# Patient Record
Sex: FEMALE | Race: WHITE | NOT HISPANIC OR LATINO | Employment: OTHER | URBAN - METROPOLITAN AREA
[De-identification: names, ages, dates, MRNs, and addresses within clinical notes are randomized per-mention and may not be internally consistent; named-entity substitution may affect disease eponyms.]

---

## 2017-03-06 ENCOUNTER — TRANSCRIBE ORDERS (OUTPATIENT)
Dept: ADMINISTRATIVE | Facility: HOSPITAL | Age: 77
End: 2017-03-06

## 2017-03-06 DIAGNOSIS — Z87.891 HISTORY OF TOBACCO USE: Primary | ICD-10-CM

## 2017-03-13 ENCOUNTER — HOSPITAL ENCOUNTER (OUTPATIENT)
Dept: RADIOLOGY | Facility: HOSPITAL | Age: 77
Discharge: HOME | End: 2017-03-13
Payer: COMMERCIAL

## 2017-03-13 DIAGNOSIS — Z87.891 HISTORY OF TOBACCO USE: ICD-10-CM

## 2017-07-03 ENCOUNTER — LAB REQUISITION (OUTPATIENT)
Dept: LAB | Facility: HOSPITAL | Age: 77
End: 2017-07-03
Payer: MEDICARE

## 2017-07-03 DIAGNOSIS — E78.5 HYPERLIPIDEMIA: ICD-10-CM

## 2017-07-03 DIAGNOSIS — M81.0 AGE-RELATED OSTEOPOROSIS WITHOUT CURRENT PATHOLOGICAL FRACTURE: ICD-10-CM

## 2017-07-03 DIAGNOSIS — E55.9 VITAMIN D DEFICIENCY: ICD-10-CM

## 2017-07-03 LAB
25(OH)D3 SERPL-MCNC: 32.9 NG/ML (ref 30–100)
ALBUMIN SERPL BCP-MCNC: 4.1 G/DL (ref 3.5–5)
ALP SERPL-CCNC: 64 U/L (ref 46–116)
ALT SERPL W P-5'-P-CCNC: 23 U/L (ref 12–78)
ANION GAP SERPL CALCULATED.3IONS-SCNC: 9 MMOL/L (ref 4–13)
AST SERPL W P-5'-P-CCNC: 16 U/L (ref 5–45)
BASOPHILS # BLD AUTO: 0.03 THOUSANDS/ΜL (ref 0–0.1)
BASOPHILS NFR BLD AUTO: 1 % (ref 0–1)
BILIRUB SERPL-MCNC: 0.64 MG/DL (ref 0.2–1)
BUN SERPL-MCNC: 14 MG/DL (ref 5–25)
CALCIUM SERPL-MCNC: 9.6 MG/DL (ref 8.3–10.1)
CHLORIDE SERPL-SCNC: 104 MMOL/L (ref 100–108)
CO2 SERPL-SCNC: 27 MMOL/L (ref 21–32)
CREAT SERPL-MCNC: 0.66 MG/DL (ref 0.6–1.3)
EOSINOPHIL # BLD AUTO: 0.12 THOUSAND/ΜL (ref 0–0.61)
EOSINOPHIL NFR BLD AUTO: 3 % (ref 0–6)
ERYTHROCYTE [DISTWIDTH] IN BLOOD BY AUTOMATED COUNT: 13.8 % (ref 11.6–15.1)
GFR SERPL CREATININE-BSD FRML MDRD: >60 ML/MIN/1.73SQ M
GLUCOSE P FAST SERPL-MCNC: 59 MG/DL (ref 65–99)
HCT VFR BLD AUTO: 42.4 % (ref 34.8–46.1)
HGB BLD-MCNC: 14 G/DL (ref 11.5–15.4)
LYMPHOCYTES # BLD AUTO: 1.67 THOUSANDS/ΜL (ref 0.6–4.47)
LYMPHOCYTES NFR BLD AUTO: 36 % (ref 14–44)
MCH RBC QN AUTO: 31.6 PG (ref 26.8–34.3)
MCHC RBC AUTO-ENTMCNC: 33 G/DL (ref 31.4–37.4)
MCV RBC AUTO: 96 FL (ref 82–98)
MONOCYTES # BLD AUTO: 0.37 THOUSAND/ΜL (ref 0.17–1.22)
MONOCYTES NFR BLD AUTO: 8 % (ref 4–12)
NEUTROPHILS # BLD AUTO: 2.51 THOUSANDS/ΜL (ref 1.85–7.62)
NEUTS SEG NFR BLD AUTO: 52 % (ref 43–75)
NRBC BLD AUTO-RTO: 0 /100 WBCS
PLATELET # BLD AUTO: 215 THOUSANDS/UL (ref 149–390)
PMV BLD AUTO: 11.9 FL (ref 8.9–12.7)
POTASSIUM SERPL-SCNC: 3.9 MMOL/L (ref 3.5–5.3)
PROT SERPL-MCNC: 7.2 G/DL (ref 6.4–8.2)
RBC # BLD AUTO: 4.43 MILLION/UL (ref 3.81–5.12)
SODIUM SERPL-SCNC: 140 MMOL/L (ref 136–145)
WBC # BLD AUTO: 4.7 THOUSAND/UL (ref 4.31–10.16)

## 2017-07-03 PROCEDURE — 85025 COMPLETE CBC W/AUTO DIFF WBC: CPT | Performed by: INTERNAL MEDICINE

## 2017-07-03 PROCEDURE — 80053 COMPREHEN METABOLIC PANEL: CPT | Performed by: INTERNAL MEDICINE

## 2017-07-03 PROCEDURE — 82306 VITAMIN D 25 HYDROXY: CPT | Performed by: INTERNAL MEDICINE

## 2017-11-01 ENCOUNTER — LAB REQUISITION (OUTPATIENT)
Dept: LAB | Facility: HOSPITAL | Age: 77
End: 2017-11-01
Payer: MEDICARE

## 2017-11-01 DIAGNOSIS — E78.5 HYPERLIPIDEMIA: ICD-10-CM

## 2017-11-01 DIAGNOSIS — M81.0 AGE-RELATED OSTEOPOROSIS WITHOUT CURRENT PATHOLOGICAL FRACTURE: ICD-10-CM

## 2017-11-01 DIAGNOSIS — E55.9 VITAMIN D DEFICIENCY: ICD-10-CM

## 2017-11-01 LAB
25(OH)D3 SERPL-MCNC: 33.2 NG/ML (ref 30–100)
ALBUMIN SERPL BCP-MCNC: 3.9 G/DL (ref 3.5–5)
ALP SERPL-CCNC: 70 U/L (ref 46–116)
ALT SERPL W P-5'-P-CCNC: 24 U/L (ref 12–78)
ANION GAP SERPL CALCULATED.3IONS-SCNC: 7 MMOL/L (ref 4–13)
AST SERPL W P-5'-P-CCNC: 16 U/L (ref 5–45)
BILIRUB SERPL-MCNC: 0.54 MG/DL (ref 0.2–1)
BUN SERPL-MCNC: 11 MG/DL (ref 5–25)
CALCIUM SERPL-MCNC: 9.6 MG/DL (ref 8.3–10.1)
CHLORIDE SERPL-SCNC: 106 MMOL/L (ref 100–108)
CHOLEST SERPL-MCNC: 220 MG/DL (ref 50–200)
CO2 SERPL-SCNC: 27 MMOL/L (ref 21–32)
CREAT SERPL-MCNC: 0.56 MG/DL (ref 0.6–1.3)
GFR SERPL CREATININE-BSD FRML MDRD: 92 ML/MIN/1.73SQ M
GLUCOSE SERPL-MCNC: 81 MG/DL (ref 65–140)
HDLC SERPL-MCNC: 53 MG/DL (ref 40–60)
LDLC SERPL CALC-MCNC: 133 MG/DL (ref 0–100)
POTASSIUM SERPL-SCNC: 4.3 MMOL/L (ref 3.5–5.3)
PROT SERPL-MCNC: 7.1 G/DL (ref 6.4–8.2)
SODIUM SERPL-SCNC: 140 MMOL/L (ref 136–145)
TRIGL SERPL-MCNC: 168 MG/DL
TSH SERPL DL<=0.05 MIU/L-ACNC: 1.79 UIU/ML (ref 0.36–3.74)

## 2017-11-01 PROCEDURE — 84443 ASSAY THYROID STIM HORMONE: CPT | Performed by: INTERNAL MEDICINE

## 2017-11-01 PROCEDURE — 80061 LIPID PANEL: CPT | Performed by: INTERNAL MEDICINE

## 2017-11-01 PROCEDURE — 80053 COMPREHEN METABOLIC PANEL: CPT | Performed by: INTERNAL MEDICINE

## 2017-11-01 PROCEDURE — 82306 VITAMIN D 25 HYDROXY: CPT | Performed by: INTERNAL MEDICINE

## 2018-02-12 ENCOUNTER — HOSPITAL ENCOUNTER (EMERGENCY)
Facility: HOSPITAL | Age: 78
Discharge: HOME | End: 2018-02-12
Attending: EMERGENCY MEDICINE
Payer: MEDICARE

## 2018-02-12 ENCOUNTER — APPOINTMENT (EMERGENCY)
Dept: RADIOLOGY | Facility: HOSPITAL | Age: 78
End: 2018-02-12
Payer: MEDICARE

## 2018-02-12 VITALS
WEIGHT: 159 LBS | OXYGEN SATURATION: 94 % | TEMPERATURE: 98 F | HEART RATE: 60 BPM | BODY MASS INDEX: 26.49 KG/M2 | SYSTOLIC BLOOD PRESSURE: 137 MMHG | HEIGHT: 65 IN | RESPIRATION RATE: 19 BRPM | DIASTOLIC BLOOD PRESSURE: 66 MMHG

## 2018-02-12 DIAGNOSIS — R07.89 MUSCULOSKELETAL CHEST PAIN: ICD-10-CM

## 2018-02-12 DIAGNOSIS — R07.9 CHEST PAIN: Primary | ICD-10-CM

## 2018-02-12 DIAGNOSIS — M54.9 BACK PAIN: ICD-10-CM

## 2018-02-12 LAB
ANION GAP SERPL CALCULATED.3IONS-SCNC: 7 MMOL/L (ref 4–13)
ATRIAL RATE: 65 BPM
BASOPHILS # BLD AUTO: 0.03 THOUSANDS/ΜL (ref 0–0.1)
BASOPHILS NFR BLD AUTO: 1 % (ref 0–1)
BUN SERPL-MCNC: 12 MG/DL (ref 5–25)
CALCIUM SERPL-MCNC: 9.2 MG/DL (ref 8.3–10.1)
CHLORIDE SERPL-SCNC: 108 MMOL/L (ref 100–108)
CO2 SERPL-SCNC: 24 MMOL/L (ref 21–32)
CREAT SERPL-MCNC: 0.63 MG/DL (ref 0.6–1.3)
EOSINOPHIL # BLD AUTO: 0.11 THOUSAND/ΜL (ref 0–0.61)
EOSINOPHIL NFR BLD AUTO: 3 % (ref 0–6)
ERYTHROCYTE [DISTWIDTH] IN BLOOD BY AUTOMATED COUNT: 13.4 % (ref 11.6–15.1)
GFR SERPL CREATININE-BSD FRML MDRD: 88 ML/MIN/1.73SQ M
GLUCOSE SERPL-MCNC: 105 MG/DL (ref 65–140)
HCT VFR BLD AUTO: 40.4 % (ref 34.8–46.1)
HGB BLD-MCNC: 13.5 G/DL (ref 11.5–15.4)
LYMPHOCYTES # BLD AUTO: 1.19 THOUSANDS/ΜL (ref 0.6–4.47)
LYMPHOCYTES NFR BLD AUTO: 31 % (ref 14–44)
MCH RBC QN AUTO: 30.8 PG (ref 26.8–34.3)
MCHC RBC AUTO-ENTMCNC: 33.4 G/DL (ref 31.4–37.4)
MCV RBC AUTO: 92 FL (ref 82–98)
MONOCYTES # BLD AUTO: 0.33 THOUSAND/ΜL (ref 0.17–1.22)
MONOCYTES NFR BLD AUTO: 9 % (ref 4–12)
NEUTROPHILS # BLD AUTO: 2.12 THOUSANDS/ΜL (ref 1.85–7.62)
NEUTS SEG NFR BLD AUTO: 56 % (ref 43–75)
NRBC BLD AUTO-RTO: 0 /100 WBCS
P AXIS: 48 DEGREES
PLATELET # BLD AUTO: 199 THOUSANDS/UL (ref 149–390)
PMV BLD AUTO: 10.4 FL (ref 8.9–12.7)
POTASSIUM SERPL-SCNC: 3.8 MMOL/L (ref 3.5–5.3)
PR INTERVAL: 162 MS
QRS AXIS: -4 DEGREES
QRSD INTERVAL: 88 MS
QT INTERVAL: 396 MS
QTC INTERVAL: 411 MS
RBC # BLD AUTO: 4.39 MILLION/UL (ref 3.81–5.12)
SODIUM SERPL-SCNC: 139 MMOL/L (ref 136–145)
T WAVE AXIS: 87 DEGREES
TROPONIN I SERPL-MCNC: <0.02 NG/ML
VENTRICULAR RATE: 65 BPM
WBC # BLD AUTO: 3.79 THOUSAND/UL (ref 4.31–10.16)

## 2018-02-12 PROCEDURE — 84484 ASSAY OF TROPONIN QUANT: CPT | Performed by: EMERGENCY MEDICINE

## 2018-02-12 PROCEDURE — 96374 THER/PROPH/DIAG INJ IV PUSH: CPT

## 2018-02-12 PROCEDURE — 36415 COLL VENOUS BLD VENIPUNCTURE: CPT | Performed by: EMERGENCY MEDICINE

## 2018-02-12 PROCEDURE — 80048 BASIC METABOLIC PNL TOTAL CA: CPT | Performed by: EMERGENCY MEDICINE

## 2018-02-12 PROCEDURE — 99285 EMERGENCY DEPT VISIT HI MDM: CPT

## 2018-02-12 PROCEDURE — 93010 ELECTROCARDIOGRAM REPORT: CPT | Performed by: INTERNAL MEDICINE

## 2018-02-12 PROCEDURE — 93005 ELECTROCARDIOGRAM TRACING: CPT

## 2018-02-12 PROCEDURE — 85025 COMPLETE CBC W/AUTO DIFF WBC: CPT | Performed by: EMERGENCY MEDICINE

## 2018-02-12 PROCEDURE — 71046 X-RAY EXAM CHEST 2 VIEWS: CPT

## 2018-02-12 RX ORDER — ALENDRONATE SODIUM 70 MG/1
70 TABLET ORAL
COMMUNITY

## 2018-02-12 RX ORDER — KETOROLAC TROMETHAMINE 30 MG/ML
15 INJECTION, SOLUTION INTRAMUSCULAR; INTRAVENOUS ONCE
Status: COMPLETED | OUTPATIENT
Start: 2018-02-12 | End: 2018-02-12

## 2018-02-12 RX ADMIN — KETOROLAC TROMETHAMINE 15 MG: 30 INJECTION, SOLUTION INTRAMUSCULAR at 08:23

## 2018-02-12 NOTE — DISCHARGE INSTRUCTIONS
Follow up with primary care doctor in 3-5 days  If your symptoms worsen, return to the ED immediately  Chest Pain, Ambulatory Care   GENERAL INFORMATION:   Chest pain  can be caused by a range of conditions, from not serious to life-threatening  It may be caused by a heart attack or a blood clot in your lungs  Sometimes chest pain or pressure is caused by poor blood flow to your heart (angina)  Infection, inflammation, or a fracture in the bones or cartilage in your chest can cause pain or discomfort  Chest pain can also be a symptom of a digestive problem, such as acid reflux or a stomach ulcer  Common symptoms include the following:   · Fever or sweating     · Nausea or vomiting     · Shortness of breath     · Discomfort or pressure that spreads from your chest to your back, jaw, or arm     · A racing or slow heartbeat     · Feeling weak, tired, or faint  Seek immediate care for the following symptoms:   · Any of the following signs of a heart attack:      ¨ Squeezing, pressure, or pain in your chest that lasts longer than 5 minutes or returns    ¨ Discomfort or pain in your back, neck, jaw, stomach, or arm     ¨ Trouble breathing     ¨ Nausea or vomiting    ¨ Lightheadedness or a sudden cold sweat, especially with trouble breathing         · Chest discomfort that gets worse, even with medicine    · Coughing or vomiting blood    · Black or bloody bowel movements     · Vomiting that does not stop, or pain when you swallow  Treatment for chest pain  may include medicine to treat your symptoms while he determines the cause of your chest pain  You may also need any of the following:  · Antiplatelets , such as aspirin, help prevent blood clots  Take your antiplatelet medicine exactly as directed  These medicines make it more likely for you to bleed or bruise  If you are told to take aspirin, do not take acetaminophen or ibuprofen instead  · Prescription pain medicine  may be given   Ask how to take this medicine safely  Do not smoke: If you smoke, it is never too late to quit  Smoking increases your risk for a heart attack and other heart and lung conditions  Ask your healthcare provider for information about how to stop smoking if you need help  Follow up with your healthcare provider as directed: You may need more tests  You may be referred to a specialist, such as a cardiologist or gastroenterologist  Write down your questions so you remember to ask them during your visits  CARE AGREEMENT:   You have the right to help plan your care  Learn about your health condition and how it may be treated  Discuss treatment options with your caregivers to decide what care you want to receive  You always have the right to refuse treatment  The above information is an  only  It is not intended as medical advice for individual conditions or treatments  Talk to your doctor, nurse or pharmacist before following any medical regimen to see if it is safe and effective for you  © 2014 [address] is for End User's use only and may not be sold, redistributed or otherwise used for commercial purposes  All illustrations and images included in CareNotes® are the copyrighted property of [employer]  or [employer]

## 2018-02-12 NOTE — ED PROVIDER NOTES
History  Chief Complaint   Patient presents with    Back Pain     Pt c/o right sided back and chest pain, worse with arm movement since yesterday    Chest Pain      30-year-old female comes emergent department for evaluation of back pain and chest pain  States that her back pain occurred last night and believes that is due to the her sleep position however she states that the back pain kept her up all night and Tylenol made mildly better  She states that she also has had chest pain this morning, dull in nature nonradiating tender palpation  She denies any risk factors for PE  States that her chest pain is better when she lays down  Otherwise, patient denies any focal neurological deficits, shortness of breath, nausea, vomiting, abdominal pain  Medical management decision making:  I will order Toradol and I will order CBC BMP chest x-ray EKG            Prior to Admission Medications   Prescriptions Last Dose Informant Patient Reported? Taking? MULTIPLE VITAMIN PO   Yes Yes   Sig: Take 1 tablet by mouth daily   alendronate (FOSAMAX) 70 mg tablet 2/11/2018 at Unknown time  Yes Yes   Sig: Take 70 mg by mouth every 7 days sunday      Facility-Administered Medications: None       History reviewed  No pertinent past medical history  History reviewed  No pertinent surgical history  History reviewed  No pertinent family history  I have reviewed and agree with the history as documented  Social History   Substance Use Topics    Smoking status: Former Smoker     Quit date: 1/1/1994    Smokeless tobacco: Never Used    Alcohol use 4 2 oz/week     7 Glasses of wine per week        Review of Systems   Constitutional: Negative for appetite change, chills, diaphoresis, fatigue and fever  HENT: Negative for congestion, ear discharge, ear pain, hearing loss, postnasal drip, rhinorrhea, sneezing and sore throat  Eyes: Negative for pain, discharge and redness     Respiratory: Negative for choking, chest tightness, shortness of breath, wheezing and stridor  Cardiovascular: Positive for chest pain  Negative for palpitations  Gastrointestinal: Negative for abdominal distention, abdominal pain, blood in stool, constipation, diarrhea, nausea and vomiting  Genitourinary: Negative for decreased urine volume, difficulty urinating, dysuria, flank pain, frequency and hematuria  Musculoskeletal: Positive for back pain  Negative for arthralgias, gait problem, joint swelling and neck pain  Skin: Negative for color change, pallor and rash  Allergic/Immunologic: Negative for environmental allergies, food allergies and immunocompromised state  Neurological: Negative for dizziness, seizures, weakness, light-headedness, numbness and headaches  Hematological: Negative for adenopathy  Does not bruise/bleed easily  Psychiatric/Behavioral: Negative for agitation and behavioral problems  Physical Exam  ED Triage Vitals   Temperature Pulse Respirations Blood Pressure SpO2   02/12/18 0712 02/12/18 0712 02/12/18 0712 02/12/18 0712 02/12/18 0712   98 °F (36 7 °C) 72 16 156/74 98 %      Temp Source Heart Rate Source Patient Position - Orthostatic VS BP Location FiO2 (%)   02/12/18 0712 02/12/18 0712 02/12/18 0852 02/12/18 0852 --   Oral Monitor Lying Right arm       Pain Score       02/12/18 0712       7           Orthostatic Vital Signs  Vitals:    02/12/18 0712 02/12/18 0852 02/12/18 0930   BP: 156/74 128/66 137/66   Pulse: 72 63 60   Patient Position - Orthostatic VS:  Lying        Physical Exam   Constitutional: She is oriented to person, place, and time  She appears well-developed and well-nourished  HENT:   Head: Normocephalic and atraumatic  Nose: Nose normal    Mouth/Throat: Oropharynx is clear and moist    Eyes: Conjunctivae and EOM are normal  Pupils are equal, round, and reactive to light  Neck: Normal range of motion  Neck supple  Cardiovascular: Normal rate, regular rhythm and normal heart sounds  Exam reveals no gallop and no friction rub  No murmur heard  Pulmonary/Chest: Effort normal and breath sounds normal    Abdominal: Soft  Bowel sounds are normal  She exhibits no distension  There is no tenderness  There is no rebound and no guarding  Musculoskeletal: Normal range of motion  She exhibits tenderness  Reproducible right paravertebral back pain and reproducible right chest wall pain   Neurological: She is alert and oriented to person, place, and time  She has normal reflexes  Skin: Skin is warm and dry  No erythema  No pallor  Psychiatric: She has a normal mood and affect  Her behavior is normal    Nursing note and vitals reviewed  ED Medications  Medications   ketorolac (TORADOL) injection 15 mg (15 mg Intravenous Given 2/12/18 0823)       Diagnostic Studies  Results Reviewed     Procedure Component Value Units Date/Time    Troponin I [[id number]]  (Normal) Collected:  02/12/18 0747    Lab Status:  Final result Specimen:  Blood from Arm, Left Updated:  02/12/18 0819     Troponin I <0 02 ng/mL     Narrative:         [employer] Chemistry analyzer 99% cutoff is > 0 04 ng/mL in network labs    o cTnI 99% cutoff is useful only when applied to patients in the clinical setting of myocardial ischemia  o cTnI 99% cutoff should be interpreted in the context of clinical history, ECG findings and possibly cardiac imaging to establish correct diagnosis  o cTnI 99% cutoff may be suggestive but clearly not indicative of a coronary event without the clinical setting of myocardial ischemia      Basic metabolic panel [[id number]] Collected:  02/12/18 0747    Lab Status:  Final result Specimen:  Blood from Arm, Left Updated:  02/12/18 0814     Sodium 139 mmol/L      Potassium 3 8 mmol/L      Chloride 108 mmol/L      CO2 24 mmol/L      Anion Gap 7 mmol/L      BUN 12 mg/dL      Creatinine 0 63 mg/dL      Glucose 105 mg/dL      Calcium 9 2 mg/dL      eGFR 88 ml/min/1 73sq m     Narrative:         National Kidney Disease Education Program recommendations are as follows:  GFR calculation is accurate only with a steady state creatinine  Chronic Kidney disease less than 60 ml/min/1 73 sq  meters  Kidney failure less than 15 ml/min/1 73 sq  meters  CBC and differential [[id number]]  (Abnormal) Collected:  02/12/18 0711    Lab Status:  Final result Specimen:  Blood from Arm, Left Updated:  02/12/18 0759     WBC 3 79 (L) Thousand/uL      RBC 4 39 Million/uL      Hemoglobin 13 5 g/dL      Hematocrit 40 4 %      MCV 92 fL      MCH 30 8 pg      MCHC 33 4 g/dL      RDW 13 4 %      MPV 10 4 fL      Platelets 582 Thousands/uL      nRBC 0 /100 WBCs      Neutrophils Relative 56 %      Lymphocytes Relative 31 %      Monocytes Relative 9 %      Eosinophils Relative 3 %      Basophils Relative 1 %      Neutrophils Absolute 2 12 Thousands/µL      Lymphocytes Absolute 1 19 Thousands/µL      Monocytes Absolute 0 33 Thousand/µL      Eosinophils Absolute 0 11 Thousand/µL      Basophils Absolute 0 03 Thousands/µL                  XR chest 2 views   ED Interpretation by Vinicio Ross MD (02/12 0622)   No acute pulmonary disease      Final Result by Riley Leos MD (02/12 1129)      No active pulmonary disease         Workstation performed: [id number]               Procedures  Procedures      Phone Consults  ED Phone Contact    ED Course  ED Course                                MDM  CritCare Time    Disposition  Final diagnoses:   Chest pain   Back pain   Musculoskeletal chest pain     Time reflects when diagnosis was documented in both MDM as applicable and the Disposition within this note     Time User Action Codes Description Comment    2/12/2018  8:44 AM Rachel Busing Add [R07 9] Chest pain     2/12/2018  8:44 AM Rachel Busing Add [M54 9] Back pain     2/12/2018  8:44 AM Rachel Busing Add [R07 89] Musculoskeletal chest pain       ED Disposition     ED Disposition Condition Comment    Discharge  Cleta Blank discharge to home/self care     Condition at discharge: Stable        Follow-up Information     Follow up With Specialties Details Why Michael Payne MD Internal Medicine In 3 days  [address]  [phone]          Discharge Medication List as of 2/12/2018  8:45 AM      CONTINUE these medications which have NOT CHANGED    Details   alendronate (FOSAMAX) 70 mg tablet Take 70 mg by mouth every 7 days sunday, Historical Med      MULTIPLE VITAMIN PO Take 1 tablet by mouth daily, Historical Med           No discharge procedures on file  ED Provider  Attending physically available and evaluated Brenda Mews  I managed the patient along with the ED Attending      Electronically Signed by         Zachery Champagne MD  02/12/18 7911

## 2018-02-12 NOTE — ED ATTENDING ATTESTATION
Ion Stock MD, saw and evaluated the patient  I have discussed the patient with the resident/non-physician practitioner and agree with the resident's/non-physician practitioner's findings, Plan of Care, and MDM as documented in the resident's/non-physician practitioner's note, except where noted  All available labs and Radiology studies were reviewed  At this point I agree with the current assessment done in the Emergency Department  I have conducted an independent evaluation of this patient a history and physical is as follows:      Critical Care Time  CritCare Time    Procedures     79 yo female c/o back pain started last night, took tylenol with no relief  Pt this morning with dull chest pain, right sided, worse with palpation, better with laying down  No fever, no sob, no n/v/d, no abdominal pain, no diaphoresis, no radiation of pain  Vss, afebrile, lungs cta, rrr, abdomen soft nontender, reproducible chest and back tenderness  Likely msk pain  Pain meds  Cardiac workup

## 2018-08-28 ENCOUNTER — APPOINTMENT (OUTPATIENT)
Dept: LAB | Facility: CLINIC | Age: 78
End: 2018-08-28
Payer: MEDICARE

## 2018-08-28 ENCOUNTER — TRANSCRIBE ORDERS (OUTPATIENT)
Dept: LAB | Facility: CLINIC | Age: 78
End: 2018-08-28
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
## 2018-08-28 PROCEDURE — 36415 COLL VENOUS BLD VENIPUNCTURE: CPT

## 2018-08-28 PROCEDURE — 86618 LYME DISEASE ANTIBODY: CPT

## 2018-08-28 PROCEDURE — 86617 LYME DISEASE ANTIBODY: CPT

## 2018-08-29 LAB
B BURGDOR IGG SER IA-ACNC: 1.25
B BURGDOR IGM SER IA-ACNC: 0.21

## 2018-08-30 LAB

## 2019-07-02 ENCOUNTER — TRANSCRIBE ORDERS (OUTPATIENT)
Dept: LAB | Facility: CLINIC | Age: 79
End: 2019-07-02

## 2019-07-02 ENCOUNTER — APPOINTMENT (OUTPATIENT)
Dept: LAB | Facility: CLINIC | Age: 79
End: 2019-07-02
Payer: MEDICARE
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
## 2019-07-02 PROCEDURE — 86618 LYME DISEASE ANTIBODY: CPT

## 2019-07-02 PROCEDURE — 86617 LYME DISEASE ANTIBODY: CPT

## 2019-07-02 PROCEDURE — 36415 COLL VENOUS BLD VENIPUNCTURE: CPT

## 2019-07-03 LAB
B BURGDOR IGG SER IA-ACNC: 1.16
B BURGDOR IGM SER IA-ACNC: 0.25

## 2019-07-05 LAB
B BURGDOR IGG PATRN SER IB-IMP: NEGATIVE
B BURGDOR IGM PATRN SER IB-IMP: NEGATIVE
B BURGDOR18KD IGG SER QL IB: PRESENT
B BURGDOR23KD IGG SER QL IB: ABNORMAL
B BURGDOR23KD IGM SER QL IB: ABNORMAL
B BURGDOR28KD IGG SER QL IB: PRESENT
B BURGDOR30KD IGG SER QL IB: ABNORMAL
B BURGDOR39KD IGG SER QL IB: ABNORMAL
B BURGDOR39KD IGM SER QL IB: ABNORMAL
B BURGDOR41KD IGG SER QL IB: ABNORMAL
B BURGDOR41KD IGM SER QL IB: ABNORMAL
B BURGDOR45KD IGG SER QL IB: ABNORMAL
B BURGDOR58KD IGG SER QL IB: ABNORMAL
B BURGDOR66KD IGG SER QL IB: ABNORMAL
B BURGDOR93KD IGG SER QL IB: PRESENT

## 2019-08-20 ENCOUNTER — TRANSCRIBE ORDERS (OUTPATIENT)
Dept: LAB | Facility: CLINIC | Age: 79
End: 2019-08-20

## 2019-08-20 ENCOUNTER — APPOINTMENT (OUTPATIENT)
Dept: LAB | Facility: CLINIC | Age: 79
End: 2019-08-20
Payer: MEDICARE
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
## 2019-08-20 PROCEDURE — 86618 LYME DISEASE ANTIBODY: CPT | Performed by: INTERNAL MEDICINE

## 2019-08-20 PROCEDURE — 36415 COLL VENOUS BLD VENIPUNCTURE: CPT | Performed by: INTERNAL MEDICINE
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
## 2019-08-22 LAB — B BURGDOR IGG+IGM SER-ACNC: <0.91 ISR (ref 0–0.9)

## 2020-01-07 ENCOUNTER — APPOINTMENT (OUTPATIENT)
Dept: LAB | Age: 80
End: 2020-01-07
Payer: MEDICARE

## 2020-01-07 ENCOUNTER — TRANSCRIBE ORDERS (OUTPATIENT)
Dept: ADMINISTRATIVE | Age: 80
End: 2020-01-07

## 2020-01-07 DIAGNOSIS — K22.9 DISORDER OF UPPER ESOPHAGEAL SPHINCTER: ICD-10-CM

## 2020-01-07 DIAGNOSIS — E55.9 VITAMIN D DEFICIENCY: ICD-10-CM

## 2020-01-07 DIAGNOSIS — E78.5 HYPERLIPIDEMIA, UNSPECIFIED HYPERLIPIDEMIA TYPE: ICD-10-CM

## 2020-01-07 DIAGNOSIS — E55.9 VITAMIN D DEFICIENCY: Primary | ICD-10-CM

## 2020-01-07 LAB
25(OH)D3 SERPL-MCNC: 26.6 NG/ML (ref 30–100)
ALBUMIN SERPL BCP-MCNC: 4 G/DL (ref 3.5–5)
ALP SERPL-CCNC: 60 U/L (ref 46–116)
ALT SERPL W P-5'-P-CCNC: 22 U/L (ref 12–78)
ANION GAP SERPL CALCULATED.3IONS-SCNC: 5 MMOL/L (ref 4–13)
AST SERPL W P-5'-P-CCNC: 14 U/L (ref 5–45)
BILIRUB SERPL-MCNC: 0.76 MG/DL (ref 0.2–1)
BUN SERPL-MCNC: 9 MG/DL (ref 5–25)
CALCIUM SERPL-MCNC: 9.7 MG/DL (ref 8.3–10.1)
CHLORIDE SERPL-SCNC: 110 MMOL/L (ref 100–108)
CHOLEST SERPL-MCNC: 218 MG/DL (ref 50–200)
CO2 SERPL-SCNC: 28 MMOL/L (ref 21–32)
CREAT SERPL-MCNC: 0.62 MG/DL (ref 0.6–1.3)
ERYTHROCYTE [DISTWIDTH] IN BLOOD BY AUTOMATED COUNT: 13.1 % (ref 11.6–15.1)
GFR SERPL CREATININE-BSD FRML MDRD: 87 ML/MIN/1.73SQ M
GLUCOSE P FAST SERPL-MCNC: 91 MG/DL (ref 65–99)
HCT VFR BLD AUTO: 41.8 % (ref 34.8–46.1)
HDLC SERPL-MCNC: 74 MG/DL
HGB BLD-MCNC: 13.7 G/DL (ref 11.5–15.4)
LDLC SERPL CALC-MCNC: 124 MG/DL (ref 0–100)
MCH RBC QN AUTO: 32.6 PG (ref 26.8–34.3)
MCHC RBC AUTO-ENTMCNC: 32.8 G/DL (ref 31.4–37.4)
MCV RBC AUTO: 100 FL (ref 82–98)
NONHDLC SERPL-MCNC: 144 MG/DL
PLATELET # BLD AUTO: 180 THOUSANDS/UL (ref 149–390)
PMV BLD AUTO: 11.3 FL (ref 8.9–12.7)
POTASSIUM SERPL-SCNC: 4.5 MMOL/L (ref 3.5–5.3)
PROT SERPL-MCNC: 7.1 G/DL (ref 6.4–8.2)
RBC # BLD AUTO: 4.2 MILLION/UL (ref 3.81–5.12)
SODIUM SERPL-SCNC: 143 MMOL/L (ref 136–145)
TRIGL SERPL-MCNC: 101 MG/DL
WBC # BLD AUTO: 3.82 THOUSAND/UL (ref 4.31–10.16)

## 2020-01-07 PROCEDURE — 82306 VITAMIN D 25 HYDROXY: CPT

## 2020-01-07 PROCEDURE — 36415 COLL VENOUS BLD VENIPUNCTURE: CPT

## 2020-01-07 PROCEDURE — 80061 LIPID PANEL: CPT

## 2020-01-07 PROCEDURE — 85027 COMPLETE CBC AUTOMATED: CPT

## 2020-01-07 PROCEDURE — 80053 COMPREHEN METABOLIC PANEL: CPT

## 2020-07-15 ENCOUNTER — TRANSCRIBE ORDERS (OUTPATIENT)
Dept: ADMINISTRATIVE | Age: 80
End: 2020-07-15

## 2020-07-15 ENCOUNTER — APPOINTMENT (OUTPATIENT)
Dept: LAB | Age: 80
End: 2020-07-15
Payer: MEDICARE

## 2020-07-15 DIAGNOSIS — A69.20 LYME DISEASE: Primary | ICD-10-CM

## 2020-07-15 DIAGNOSIS — A69.20 LYME DISEASE: ICD-10-CM

## 2020-07-15 PROCEDURE — 36415 COLL VENOUS BLD VENIPUNCTURE: CPT

## 2020-07-15 PROCEDURE — 86618 LYME DISEASE ANTIBODY: CPT

## 2020-07-16 LAB — B BURGDOR IGG+IGM SER-ACNC: <0.91 ISR (ref 0–0.9)

## 2020-08-31 ENCOUNTER — TRANSCRIBE ORDERS (OUTPATIENT)
Dept: ADMINISTRATIVE | Age: 80
End: 2020-08-31

## 2020-08-31 ENCOUNTER — APPOINTMENT (OUTPATIENT)
Dept: LAB | Age: 80
End: 2020-08-31
Payer: MEDICARE

## 2020-08-31 DIAGNOSIS — A69.20 LYME DISEASE: ICD-10-CM

## 2020-08-31 DIAGNOSIS — A69.20 LYME DISEASE: Primary | ICD-10-CM

## 2020-08-31 PROCEDURE — 86618 LYME DISEASE ANTIBODY: CPT

## 2020-08-31 PROCEDURE — 36415 COLL VENOUS BLD VENIPUNCTURE: CPT

## 2020-09-01 LAB — B BURGDOR IGG+IGM SER-ACNC: <0.91 ISR (ref 0–0.9)

## 2022-03-10 ENCOUNTER — APPOINTMENT (OUTPATIENT)
Dept: RADIOLOGY | Age: 82
End: 2022-03-10
Payer: MEDICARE

## 2022-03-10 DIAGNOSIS — M25.562 PAIN IN BOTH KNEES, UNSPECIFIED CHRONICITY: ICD-10-CM

## 2022-03-10 DIAGNOSIS — M25.561 PAIN IN BOTH KNEES, UNSPECIFIED CHRONICITY: ICD-10-CM

## 2022-03-10 PROCEDURE — 73562 X-RAY EXAM OF KNEE 3: CPT

## 2022-03-22 ENCOUNTER — EVALUATION (OUTPATIENT)
Dept: PHYSICAL THERAPY | Age: 82
End: 2022-03-22
Payer: MEDICARE

## 2022-03-22 DIAGNOSIS — M17.0 OSTEOARTHRITIS OF BOTH KNEES, UNSPECIFIED OSTEOARTHRITIS TYPE: Primary | ICD-10-CM

## 2022-03-22 PROCEDURE — 97162 PT EVAL MOD COMPLEX 30 MIN: CPT | Performed by: PHYSICAL THERAPIST

## 2022-03-22 PROCEDURE — 97110 THERAPEUTIC EXERCISES: CPT | Performed by: PHYSICAL THERAPIST

## 2022-03-22 NOTE — PROGRESS NOTES
PT Evaluation     Today's date: 3/22/2022  Patient name: Beti Lema  : 1940  MRN: [id number]  Referring provider: Sapna Clinton MD  Dx:   Encounter Diagnosis     ICD-10-CM    1  Osteoarthritis of both knees, unspecified osteoarthritis type  M17 0                   Assessment  Assessment details: PT IE: 22  Patient reported onset of bilateral knee pain that is chronic but has increased over the past 1-2 weeks  Patient noted her right knee is more painful than left  Patient noted her right knee does swell more than left  Patient noted the following deficits due to bilateral knee pain: static standing > 5 - 10 minutes, kneeling, squatting, prolonged standing, stair ascent more challenging than descent and picking up items off the floor  Patient denies limping due to bilateral knee pain  Patient noted pain on right knee is medial and inferior while left knee pain is medial as well  Patient noted she still has to perform her daily chores due to not having anyone else to help  Patient noted the following as pain reducing: Voltaren gel  Patient had x-ray on each knee that was + for arthritic changes  Patient noted bilateral knee pain does not limit her sleep  Patient noted right le is weaker than left  Patient noted she has not fallen  Patient noted she will use a brace intermittently  Impairments: abnormal gait, abnormal or restricted ROM, abnormal movement, activity intolerance, lacks appropriate home exercise program and pain with function  Understanding of Dx/Px/POC: excellent   Prognosis: good  Prognosis details: Patient is a 80y o  year old female seen for outpatient PT evaluation with pain, mobility and functional deficits due to bilateral knee pain secondary to DJD   Patient presents to PT IE with the following problems, concerns, deficits and impairments: bilateral knee pain, decreased bilateral le range of motion, decreased bilateral le strength, right knee edema, gait and stair dysfuncitons, functional limitations and decreased tolerance to activity  Patient would benefit from skilled PT services under the following PT treatment plan to address the above noted deficits: therapeutic exercises and activities to facilitate bilateral le rom and strength, modalities, manual therapy techniques, gait and stair training, transfer training, balance and proprioception activities, IASTM techniques, Kinesio taping techniques and a hep  Thank you for the referral      Goals  Short Term goals - 4 weeks  1  Patient will be independent HEP  2   Patient will report a 25 - 50% decrease in pain complaints  3   Increase strength 1/2 grade  4   Increase ROM 5-10 degrees  Long Term goals - 8 weeks  1  Patient will report elimination of pain complaints  2   Patient will return to all recreational activities without restriction  3   ROM WFL  4   Strength 5/5   5   Patient will report static standing activities improved by > 15 minutes prior to bilateral knee symptoms or limitations  6   Patient will report she is able to resume kneeling activities in [place] without bilateral knee symptoms or limitations  7   Patient will report ability to squat during house hold activities without bilateral knee symptoms or limitations  8   Patient will report ability to improve stair ascent in which she is able to perform in a reciprocal pattern  9   Patient will report the ability to pick things up off the floor without bilateral knee symptoms or limitations        Plan  Patient would benefit from: skilled physical therapy  Planned modality interventions: cryotherapy, TENS, thermotherapy: hydrocollator packs, ultrasound and unattended electrical stimulation  Planned therapy interventions: joint mobilization, manual therapy, massage, neuromuscular re-education, aquatic therapy, balance, balance/weight bearing training, body mechanics training, compression, self care, patient education, strengthening, stretching, therapeutic exercise, therapeutic activities, therapeutic training, transfer training, flexibility, functional ROM exercises, graded activity, gait training, graded exercise, graded motor and home exercise program  Frequency: 2x week  Duration in weeks: 8  Treatment plan discussed with: patient        Subjective Evaluation    History of Present Illness  Mechanism of injury: Patient's PMHx is remarkable for hernia surgery x 2 and ulcer  RIGHT KNEE     INDICATION:   M25 561: Pain in right knee  M25 562: Pain in left knee      COMPARISON:  None     VIEWS:  XR KNEE 3 VW RIGHT NON INJURY         FINDINGS:     There is no acute fracture or dislocation      There is no joint effusion      Moderate to severe osteoarthrosis of the medial femorotibial compartment with joint space narrowing and osteophytosis      Moderate osteoarthrosis of the lateral femorotibial and patellofemoral compartments with small osteophytes      No lytic or blastic osseous lesion      There are atherosclerotic calcifications  Soft tissues are otherwise unremarkable      IMPRESSION:  Tricompartmental osteoarthrosis of the right knee most pronounced at the medial femorotibial compartment  No acute osseous abnormality  LEFT KNEE     INDICATION:   M25 561: Pain in right knee  M25 562: Pain in left knee      COMPARISON:  None     VIEWS:  XR KNEE 3 VW LEFT NON INJURY         FINDINGS:     There is no acute fracture or dislocation      There is no joint effusion      Moderate to severe osteoarthrosis of the medial femorotibial compartment with joint space narrowing and osteophytosis      Mild osteoarthrosis of the lateral femorotibial and patellofemoral compartments with small osteophytes      No lytic or blastic osseous lesion      There are atherosclerotic calcifications  Soft tissues are otherwise unremarkable      IMPRESSION:  Tricompartmental osteoarthrosis of the left knee most pronounced at the medial femorotibial compartment    No acute osseous abnormality      Pain  At best pain ratin  At worst pain ratin  Location: bilateral knee      Diagnostic Tests  X-ray: abnormal  Patient Goals  Patient goals for therapy: decreased pain, decreased edema, increased strength, independence with ADLs/IADLs and increased motion          Objective     Tenderness     Additional Tenderness Details  Patient is - TTP at bilateral knee joint  Active Range of Motion   Left Hip   Flexion: 130 degrees   Abduction: 22 degrees     Right Hip   Flexion: 128 degrees   Abduction: 18 degrees   Left Knee   Flexion: 122 degrees   Extension: -5 degrees   Extensor la degrees     Right Knee   Flexion: 106 degrees with pain  Extension: -6 degrees with pain  Extensor la degrees   Left Ankle/Foot   Dorsiflexion (ke): 8 degrees   Plantar flexion: 46 degrees     Right Ankle/Foot   Dorsiflexion (ke): 6 degrees   Plantar flexion: 44 degrees     Additional Active Range of Motion Details  Hamstring mobility on right at degrees and left at 64 degrees    Strength/Myotome Testing     Left Hip   Planes of Motion   Flexion: 4+  Extension: 4+  Abduction: 4+  Adduction: 5    Right Hip   Planes of Motion   Flexion: 4  Extension: 4+  Abduction: 4+  Adduction: 5    Left Knee   Flexion: 4+  Extension: 4    Right Knee   Flexion: 4+  Extension: 4-    Left Ankle/Foot   Dorsiflexion: 4+  Plantar flexion: 5    Right Ankle/Foot   Dorsiflexion: 4  Plantar flexion: 5    Ambulation     Ambulation: Level Surfaces   Ambulation without assistive device: independent    Additional Level Surfaces Ambulation Details  Patient ambulates with decrease in right knee extension with mid stance phase of gait      Ambulation: Stairs   Ascend stairs: independent  Pattern: reciprocal  Railings: two rails  Descend stairs: independent  Pattern: non-reciprocal  Railings: two rails    General Comments:      Knee Comments  Girth Measurements:  Knee:  Suprapatellar region: Right at 38 0 CM and left at 37 0 CM;  Mid patellar region: Right at 38 6 CM and left at 37 6 Cm;  Infrapatellar region: Right at 36 8 CM and left at 35 5Cm  Precautions: Bilateral knee pain aggravation        Manuals 3/22                                                                Neuro Re-Ed                                                                                                        Ther Ex             Nu step or recumbent bike             LAQ:B: 10 x             Seated hip flexion:B:                          Supine hamstring stretch:B:             Quad sets with slr flexion:B: 10 x             Heel slides:B: 10 x             Bridges 10 x             SAQ:B:                          Side lying hip abduction:B:             Prone hip extension:B:             Prone TKE:B:             Prone knee flexion:B:                          Standing slr x 3:B:             Standing hr and tr:B:             Mini squats to chair             Lunges of foam:B:             SLS and Tandem stance on foam:B:             Side stepping and tandem ambulation on foam                          Forward step ups:B: 6"            Lateral step ups:B: 6"            Step downs:B: 4"                                                                             Ther Activity                                       Gait Training                                       Modalities             MHP to bilateral knees while seated PRN

## 2022-03-22 NOTE — LETTER
2022    Marianna Johnson MD  [address] [address]    Patient: Tea Blanco   YOB: 1940   Date of Visit: 3/22/2022     Encounter Diagnosis     ICD-10-CM    1  Osteoarthritis of both knees, unspecified osteoarthritis type  M17 0 PT plan of care cert/re-cert       Dear Dr Geraldine Solano: Thank you for your recent referral of Tea Blanco  Please review the attached evaluation summary from Sasha's recent visit  Please verify that you agree with the plan of care by signing the attached order  If you have any questions or concerns, please do not hesitate to call  I sincerely appreciate the opportunity to share in the care of one of your patients and hope to have another opportunity to work with you in the near future  Sincerely,    Sanchez Whitley, PT      Referring Provider:      I certify that I have read the below Plan of Care and certify the need for these services furnished under this plan of treatment while under my care  Marianna Johnson MD  [address] [address]  Via Fax: [phone]          PT Evaluation     Today's date: 3/22/2022  Patient name: Tea Blanco  : 1940  MRN: [id number]  Referring provider: Scott Neff MD  Dx:   Encounter Diagnosis     ICD-10-CM    1  Osteoarthritis of both knees, unspecified osteoarthritis type  M17 0                   Assessment  Assessment details: PT IE: 22  Patient reported onset of bilateral knee pain that is chronic but has increased over the past 1-2 weeks  Patient noted her right knee is more painful than left  Patient noted her right knee does swell more than left  Patient noted the following deficits due to bilateral knee pain: static standing > 5 - 10 minutes, kneeling, squatting, prolonged standing, stair ascent more challenging than descent and picking up items off the floor    Patient denies limping due to bilateral knee pain   Patient noted pain on right knee is medial and inferior while left knee pain is medial as well  Patient noted she still has to perform her daily chores due to not having anyone else to help  Patient noted the following as pain reducing: Voltaren gel  Patient had x-ray on each knee that was + for arthritic changes  Patient noted bilateral knee pain does not limit her sleep  Patient noted right le is weaker than left  Patient noted she has not fallen  Patient noted she will use a brace intermittently  Impairments: abnormal gait, abnormal or restricted ROM, abnormal movement, activity intolerance, lacks appropriate home exercise program and pain with function  Understanding of Dx/Px/POC: excellent   Prognosis: good  Prognosis details: Patient is a 80y o  year old female seen for outpatient PT evaluation with pain, mobility and functional deficits due to bilateral knee pain secondary to DJD  Patient presents to PT IE with the following problems, concerns, deficits and impairments: bilateral knee pain, decreased bilateral le range of motion, decreased bilateral le strength, right knee edema, gait and stair dysfuncitons, functional limitations and decreased tolerance to activity  Patient would benefit from skilled PT services under the following PT treatment plan to address the above noted deficits: therapeutic exercises and activities to facilitate bilateral le rom and strength, modalities, manual therapy techniques, gait and stair training, transfer training, balance and proprioception activities, IASTM techniques, Kinesio taping techniques and a hep  Thank you for the referral      Goals  Short Term goals - 4 weeks  1  Patient will be independent HEP  2   Patient will report a 25 - 50% decrease in pain complaints  3   Increase strength 1/2 grade  4   Increase ROM 5-10 degrees  Long Term goals - 8 weeks  1  Patient will report elimination of pain complaints    2   Patient will return to all recreational activities without restriction  3   ROM WFL  4   Strength 5/5   5   Patient will report static standing activities improved by > 15 minutes prior to bilateral knee symptoms or limitations  6   Patient will report she is able to resume kneeling activities in Sikh[employer] without bilateral knee symptoms or limitations  7   Patient will report ability to squat during house hold activities without bilateral knee symptoms or limitations  8   Patient will report ability to improve stair ascent in which she is able to perform in a reciprocal pattern  9   Patient will report the ability to pick things up off the floor without bilateral knee symptoms or limitations  Plan  Patient would benefit from: skilled physical therapy  Planned modality interventions: cryotherapy, TENS, thermotherapy: hydrocollator packs, ultrasound and unattended electrical stimulation  Planned therapy interventions: joint mobilization, manual therapy, massage, neuromuscular re-education, aquatic therapy, balance, balance/weight bearing training, body mechanics training, compression, self care, patient education, strengthening, stretching, therapeutic exercise, therapeutic activities, therapeutic training, transfer training, flexibility, functional ROM exercises, graded activity, gait training, graded exercise, graded motor and home exercise program  Frequency: 2x week  Duration in weeks: 8  Treatment plan discussed with: patient        Subjective Evaluation    History of Present Illness  Mechanism of injury: Patient's PMHx is remarkable for hernia surgery x 2 and ulcer      RIGHT KNEE     INDICATION:   M25 561: Pain in right knee  M25 562: Pain in left knee      COMPARISON:  None     VIEWS:  XR KNEE 3 VW RIGHT NON INJURY         FINDINGS:     There is no acute fracture or dislocation      There is no joint effusion      Moderate to severe osteoarthrosis of the medial femorotibial compartment with joint space narrowing and osteophytosis      Moderate osteoarthrosis of the lateral femorotibial and patellofemoral compartments with small osteophytes      No lytic or blastic osseous lesion      There are atherosclerotic calcifications  Soft tissues are otherwise unremarkable      IMPRESSION:  Tricompartmental osteoarthrosis of the right knee most pronounced at the medial femorotibial compartment  No acute osseous abnormality  LEFT KNEE     INDICATION:   M25 561: Pain in right knee  M25 562: Pain in left knee      COMPARISON:  None     VIEWS:  XR KNEE 3 VW LEFT NON INJURY         FINDINGS:     There is no acute fracture or dislocation      There is no joint effusion      Moderate to severe osteoarthrosis of the medial femorotibial compartment with joint space narrowing and osteophytosis      Mild osteoarthrosis of the lateral femorotibial and patellofemoral compartments with small osteophytes      No lytic or blastic osseous lesion      There are atherosclerotic calcifications  Soft tissues are otherwise unremarkable      IMPRESSION:  Tricompartmental osteoarthrosis of the left knee most pronounced at the medial femorotibial compartment  No acute osseous abnormality      Pain  At best pain ratin  At worst pain ratin  Location: bilateral knee      Diagnostic Tests  X-ray: abnormal  Patient Goals  Patient goals for therapy: decreased pain, decreased edema, increased strength, independence with ADLs/IADLs and increased motion          Objective     Tenderness     Additional Tenderness Details  Patient is - TTP at bilateral knee joint      Active Range of Motion   Left Hip   Flexion: 130 degrees   Abduction: 22 degrees     Right Hip   Flexion: 128 degrees   Abduction: 18 degrees   Left Knee   Flexion: 122 degrees   Extension: -5 degrees   Extensor la degrees     Right Knee   Flexion: 106 degrees with pain  Extension: -6 degrees with pain  Extensor la degrees   Left Ankle/Foot   Dorsiflexion (ke): 8 degrees   Plantar flexion: 46 degrees     Right Ankle/Foot   Dorsiflexion (ke): 6 degrees   Plantar flexion: 44 degrees     Additional Active Range of Motion Details  Hamstring mobility on right at degrees and left at 64 degrees    Strength/Myotome Testing     Left Hip   Planes of Motion   Flexion: 4+  Extension: 4+  Abduction: 4+  Adduction: 5    Right Hip   Planes of Motion   Flexion: 4  Extension: 4+  Abduction: 4+  Adduction: 5    Left Knee   Flexion: 4+  Extension: 4    Right Knee   Flexion: 4+  Extension: 4-    Left Ankle/Foot   Dorsiflexion: 4+  Plantar flexion: 5    Right Ankle/Foot   Dorsiflexion: 4  Plantar flexion: 5    Ambulation     Ambulation: Level Surfaces   Ambulation without assistive device: independent    Additional Level Surfaces Ambulation Details  Patient ambulates with decrease in right knee extension with mid stance phase of gait  Ambulation: Stairs   Ascend stairs: independent  Pattern: reciprocal  Railings: two rails  Descend stairs: independent  Pattern: non-reciprocal  Railings: two rails    General Comments:      Knee Comments  Girth Measurements:  Knee:  Suprapatellar region: Right at 38 0 CM and left at 37 0 CM; Mid patellar region: Right at 38 6 CM and left at 37 6 Cm;  Infrapatellar region: Right at 36 8 CM and left at 35 5Cm  Precautions: Bilateral knee pain aggravation        Manuals 3/22                                                                Neuro Re-Ed                                                                                                        Ther Ex             Nu step or recumbent bike             LAQ:B: 10 x             Seated hip flexion:B:                          Supine hamstring stretch:B:             Quad sets with slr flexion:B: 10 x             Heel slides:B: 10 x             Bridges 10 x             SAQ:B:                          Side lying hip abduction:B:             Prone hip extension:B:             Prone TKE:B:             Prone knee flexion:B:                          Standing slr x 3:B:             Standing hr and tr:B:             Mini squats to chair             Lunges of foam:B:             SLS and Tandem stance on foam:B:             Side stepping and tandem ambulation on foam                          Forward step ups:B: 6"            Lateral step ups:B: 6"            Step downs:B: 4"                                                                             Ther Activity                                       Gait Training                                       Modalities             MHP to bilateral knees while seated PRN

## 2022-03-30 ENCOUNTER — OFFICE VISIT (OUTPATIENT)
Dept: PHYSICAL THERAPY | Age: 82
End: 2022-03-30
Payer: MEDICARE

## 2022-03-30 DIAGNOSIS — M17.0 OSTEOARTHRITIS OF BOTH KNEES, UNSPECIFIED OSTEOARTHRITIS TYPE: Primary | ICD-10-CM

## 2022-03-30 PROCEDURE — 97110 THERAPEUTIC EXERCISES: CPT | Performed by: PHYSICAL THERAPIST

## 2022-03-30 NOTE — PROGRESS NOTES
Daily Note     Today's date: 3/30/2022  Patient name: Coral Chowdhury  : 1940  MRN: [id number]  Referring provider: Lee Ann Chris MD  Dx:   Encounter Diagnosis     ICD-10-CM    1  Osteoarthritis of both knees, unspecified osteoarthritis type  M17 0                   Subjective: Patient reported bilateral knee pain is at minimal levels and she noted hep is going well  Objective: See treatment diary below  Assessment: Tolerated treatment well  Patient exhibited good technique with therapeutic exercises  Patient able to perform both open and closed kinetic chain activities with only DOMS presentation  Thus, PT is warranted to facilitate increase in bilateral le MMT and functional progress  Patient provided with new written  Hep  Plan: Continue per plan of care  Precautions: Bilateral knee pain aggravation        Manuals 3/22 3/30                                                               Neuro Re-Ed                                                                                                        Ther Ex             Nu step or recumbent bike  8 min           LAQ:B: 10 x  3 sec x 20           Seated hip flexion:B:  2 x 10                        Supine hamstring stretch:B:  NT           Quad sets with slr flexion:B: 10 x  2 x 10           Heel slides:B: 10 x  2 x 10           Bridges 10 x  2 x 10           SAQ:B:                          Side lying hip abduction:B:             Prone hip extension:B:             Prone TKE:B:             Prone knee flexion:B:                          Standing slr x 3:B:  2 x 10           Standing hr and tr:B:  2 x 10           Mini squats to chair  2 x 10           Lunges of foam:B:  NT           SLS and Tandem stance on foam:B:  30 sec x 2           Side stepping and tandem ambulation on foam  4 x in parallel bars                        Forward step ups:B: 6"            Lateral step ups:B: 6"            Step downs:B: 4" Ther Activity                                       Gait Training                                       Modalities             MHP to bilateral knees while seated PRN

## 2022-04-04 ENCOUNTER — OFFICE VISIT (OUTPATIENT)
Dept: PHYSICAL THERAPY | Age: 82
End: 2022-04-04
Payer: MEDICARE

## 2022-04-04 DIAGNOSIS — M17.0 OSTEOARTHRITIS OF BOTH KNEES, UNSPECIFIED OSTEOARTHRITIS TYPE: Primary | ICD-10-CM

## 2022-04-04 PROCEDURE — 97110 THERAPEUTIC EXERCISES: CPT

## 2022-04-04 NOTE — PROGRESS NOTES
Daily Note     Today's date: 2022  Patient name: Essie Shipman  : 1940  MRN: [id number]  Referring provider: Joselyn Rich MD  Dx:   Encounter Diagnosis     ICD-10-CM    1  Osteoarthritis of both knees, unspecified osteoarthritis type  M17 0                   Subjective: Patient noted 5/10 pain today at B knees  Objective: See treatment diary below  Assessment: Decreased pain to 2/10 after B LE exercises  Plan: Continue per plan of care  Precautions: Bilateral knee pain aggravation        Manuals 3/22 3/30 4/4                                    Neuro Re-Ed                                                    Ther Ex             Nu step or recumbent bike  8 min 10 min           LAQ:B: 10 x  3 sec x 20 20x 3#           Seated hip flexion:B:  2 x 10 20x 3#                        Supine hamstring stretch:B:  NT 20sec 5x            Quad sets with slr flexion:B: 10 x  2 x 10 20x           Heel slides:B: 10 x  2 x 10 20x           Bridges 10 x  2 x 10 20x 3sec           SAQ:B:   20x 3sec 3#                        Side lying hip abduction:B:   NT          Prone hip extension:B:   NT          Prone TKE:B:   NT          Prone knee flexion:B:   NT                       Standing slr x 3:B:  2 x 10 20X 3#           Standing hr and tr:B:  2 x 10 20X 3#           Mini squats to chair  2 x 10 20X 3#           Lunges of foam:B:  NT NT          SLS and Tandem stance on foam:B:  30 sec x 2 30SEC 5X          Side stepping and tandem ambulation on foam  4 x in parallel bars 6X                        Forward step ups:B: 6"  20X           Lateral step ups:B: 6"  NT          Step downs:B: 4"  NT                                    Ther Activity                                       Gait Training                                       Modalities             MHP to bilateral knees while seated PRN

## 2022-04-06 ENCOUNTER — OFFICE VISIT (OUTPATIENT)
Dept: PHYSICAL THERAPY | Age: 82
End: 2022-04-06
Payer: MEDICARE

## 2022-04-06 DIAGNOSIS — M17.0 OSTEOARTHRITIS OF BOTH KNEES, UNSPECIFIED OSTEOARTHRITIS TYPE: Primary | ICD-10-CM

## 2022-04-06 PROCEDURE — 97110 THERAPEUTIC EXERCISES: CPT | Performed by: PHYSICAL THERAPIST

## 2022-04-06 NOTE — PROGRESS NOTES
Daily Note     Today's date: 2022  Patient name: Rojas Cope  : 1940  MRN: [id number]  Referring provider: Murtis Cushing, MD  Dx:   Encounter Diagnosis     ICD-10-CM    1  Osteoarthritis of both knees, unspecified osteoarthritis type  M17 0                   Subjective: Patient right knee pain is > left with prolonged walking or fast walking as pain aggravating  Thus, she noted she has reduced her pace and distance for pain reduction for bilateral knees  Objective: See treatment diary below  Assessment: Patient presents with bilateral le fatigue with both open and closed kinetic chain activities that mimics standing based functional deficits  Thus, PT is warranted to facilitate bilateral le rom and strength to facilitate functional progress to return to all symptom free functional status  Plan: Continue per plan of care  Precautions: Bilateral knee pain aggravation        Manuals 3/22 3/30 4/4 4/6                                   Neuro Re-Ed                                                    Ther Ex             Nu step or recumbent bike  8 min 10 min  Nu step 10 min         LAQ:B: 10 x  3 sec x 20 20x 3#  3 sec x 20 with 3#         Seated hip flexion:B:  2 x 10 20x 3#  3 sec x 20 with 3#                      Supine hamstring stretch:B:  NT 20sec 5x  20 sec x 5         Quad sets with slr flexion:B: 10 x  2 x 10 20x  2# x 20         Heel slides:B: 10 x  2 x 10 20x  2 x 10         Bridges 10 x  2 x 10 20x 3sec  3 sec x 20         SAQ:B:   20x 3sec 3#  3# x 20                      Side lying hip abduction:B:   NT NT         Prone hip extension:B:   NT NT         Prone TKE:B:   NT NT         Prone knee flexion:B:   NT NT                      Standing slr x 3:B:  2 x 10 20X 3#  3# x 20         Standing hr and tr:B:  2 x 10 20X 3#  3# x 20         Mini squats to chair  2 x 10 20X 3#  2 x 10         Lunges of foam:B:  NT NT 2 x 10 with 3#         SLS and Tandem stance on foam:B: 30 sec x 2 30SEC 5X 30 sec x 4         Side stepping and tandem ambulation on foam  4 x in parallel bars 6X  3# x 6 on foam                      Forward step ups:B: 6"  20X  2 x 10 on 6"         Lateral step ups:B: 6"  NT 2 x 10 on 6"         Step downs:B: 4"  NT NT                                   Ther Activity                                       Gait Training                                       Modalities             MHP to bilateral knees while seated PRN

## 2022-04-11 ENCOUNTER — OFFICE VISIT (OUTPATIENT)
Dept: PHYSICAL THERAPY | Age: 82
End: 2022-04-11
Payer: MEDICARE

## 2022-04-11 DIAGNOSIS — M17.0 OSTEOARTHRITIS OF BOTH KNEES, UNSPECIFIED OSTEOARTHRITIS TYPE: Primary | ICD-10-CM

## 2022-04-11 PROCEDURE — 97110 THERAPEUTIC EXERCISES: CPT | Performed by: PHYSICAL THERAPIST

## 2022-04-11 NOTE — PROGRESS NOTES
Daily Note     Today's date: 2022  Patient name: Leyda Guerrier  : 1940  MRN: [id number]  Referring provider: David Camacho MD  Dx:   Encounter Diagnosis     ICD-10-CM    1  Osteoarthritis of both knees, unspecified osteoarthritis type  M17 0                   Subjective: Patient reported intermittent right knee pain which is produced after physical activities  Patient reported she is feeling better over all but still gets right > left knee pain aggravation after prolonged weight baring activities  Objective: See treatment diary below  Assessment: Patient presents with lumbar spine extension treatment principle with left lumbar spine pain 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  Thus, PT is warranted to facilitate bilateral le rom and strength to facilitate functional progress to return to all symptom free functional status  Plan: Continue per plan of care  Precautions: Bilateral knee pain aggravation        Manuals 3/22 3/30 4/4 4/6 4/11                                  Neuro Re-Ed                                                    Ther Ex             Nu step or recumbent bike  8 min 10 min  Nu step 10 min Nu step 10 min        Lumbar extension against parallel bar or counter top     3 x 10                     LAQ:B: 10 x  3 sec x 20 20x 3#  3 sec x 20 with 3# 3 sec x 20 with 3#        Seated hip flexion:B:  2 x 10 20x 3#  3 sec x 20 with 3# NT                     Supine hamstring stretch:B:  NT 20sec 5x  20 sec x 5 NT        Quad sets with slr flexion:B: 10 x  2 x 10 20x  2# x 20 2# x 20        Heel slides:B: 10 x  2 x 10 20x  2 x 10 2# x 20        Bridges 10 x  2 x 10 20x 3sec  3 sec x 20 3 sec x 20        SAQ:B:   20x 3sec 3#  3# x 20 2# x 20 with 3 sec hold                     Side lying hip abduction:B:   NT NT NT        Prone hip extension:B:   NT NT NT        Prone TKE:B:   NT NT NT        Prone knee flexion:B:   NT NT NT Standing slr x 3:B:  2 x 10 20X 3#  3# x 20 3# x 20        Standing hr and tr:B:  2 x 10 20X 3#  3# x 20 2 x 10        Mini squats to chair  2 x 10 20X 3#  2 x 10 2 x 10        Lunges of foam:B:  NT NT 2 x 10 with 3# 3# x 20        SLS and Tandem stance on foam:B:  30 sec x 2 30SEC 5X 30 sec x 4 30 sec x 4        Side stepping and tandem ambulation on foam  4 x in parallel bars 6X  3# x 6 on foam 3# x 4  Each direction on foam                     Forward step ups:B: 6"  20X  2 x 10 on 6" NT        Lateral step ups:B: 6"  NT 2 x 10 on 6" NT        Step downs:B: 4"  NT NT NT                                  Ther Activity                                       Gait Training                                       Modalities             MHP to bilateral knees while seated PRN

## 2022-04-14 ENCOUNTER — OFFICE VISIT (OUTPATIENT)
Dept: PHYSICAL THERAPY | Age: 82
End: 2022-04-14
Payer: MEDICARE

## 2022-04-14 DIAGNOSIS — M17.0 OSTEOARTHRITIS OF BOTH KNEES, UNSPECIFIED OSTEOARTHRITIS TYPE: Primary | ICD-10-CM

## 2022-04-14 PROCEDURE — 97110 THERAPEUTIC EXERCISES: CPT

## 2022-04-14 NOTE — PROGRESS NOTES
Daily Note     Today's date: 2022  Patient name: Corinna Castillo  : 1940  MRN: [id number]  Referring provider: Chely Villarreal MD  Dx:   Encounter Diagnosis     ICD-10-CM    1  Osteoarthritis of both knees, unspecified osteoarthritis type  M17 0                   Subjective: Patient reported "I am feeling stronger my left knee gets sore"       Objective: See treatment diary below  Assessment: Fair tolerance to exercises  Challenged with L LE exercises due to intermittent pain  Plan: Continue per plan of care  Precautions: Bilateral knee pain aggravation        Manuals 3/22 3/30 4/4 4/6 4/11 4/14                                 Neuro Re-Ed                                                    Ther Ex             Nu step or recumbent bike  8 min 10 min  Nu step 10 min Nu step 10 min Recumbent bike x 10 min       Lumbar extension against parallel bar or counter top     3 x 10 3 x 10                    LAQ:B: 10 x  3 sec x 20 20x 3#  3 sec x 20 with 3# 3 sec x 20 with 3# NT       Seated hip flexion:B:  2 x 10 20x 3#  3 sec x 20 with 3# NT NT                    Supine hamstring stretch:B:  NT 20sec 5x  20 sec x 5 NT 20SEC 5X        Quad sets with slr flexion:B: 10 x  2 x 10 20x  2# x 20 2# x 20 20X 3SEC        Heel slides:B: 10 x  2 x 10 20x  2 x 10 2# x 20 NT       Bridges 10 x  2 x 10 20x 3sec  3 sec x 20 3 sec x 20 20X 2SEC        SAQ:B:   20x 3sec 3#  3# x 20 2# x 20 with 3 sec hold 3# 3SEC 20X                     Side lying hip abduction:B:   NT NT NT NT       Prone hip extension:B:   NT NT NT NT       Prone TKE:B:   NT NT NT NT       Prone knee flexion:B:   NT NT NT NT                    Standing slr x 3:B:  2 x 10 20X 3#  3# x 20 3# x 20 20X 3#        Standing hr and tr:B:  2 x 10 20X 3#  3# x 20 2 x 10 20X        Mini squats to chair  2 x 10 20X 3#  2 x 10 2 x 10 20X        Lunges of foam:B:  NT NT 2 x 10 with 3# 3# x 20 NT       SLS and Tandem stance on foam:B:  30 sec x 2 30SEC 5X 30 sec x 4 30 sec x 4 NT       Side stepping and tandem ambulation on foam  4 x in parallel bars 6X  3# x 6 on foam 3# x 4  Each direction on foam 6X on foam   3#                     Forward step ups:B: 6"  20X  2 x 10 on 6" NT 20x 3# 8"       Lateral step ups:B: 6"  NT 2 x 10 on 6" NT 10x B   8" 3#        Step downs:B: 4"  NT NT NT NT                                 Ther Activity                                       Gait Training                                       Modalities             MHP to bilateral knees while seated PRN

## 2022-04-18 ENCOUNTER — OFFICE VISIT (OUTPATIENT)
Dept: PHYSICAL THERAPY | Age: 82
End: 2022-04-18
Payer: MEDICARE

## 2022-04-18 DIAGNOSIS — M17.0 OSTEOARTHRITIS OF BOTH KNEES, UNSPECIFIED OSTEOARTHRITIS TYPE: Primary | ICD-10-CM

## 2022-04-18 PROCEDURE — 97110 THERAPEUTIC EXERCISES: CPT

## 2022-04-18 NOTE — PROGRESS NOTES
Daily Note     Today's date: 2022  Patient name: Essie Shipman  : 1940  MRN: [id number]  Referring provider: Joselyn Rich MD  Dx:   Encounter Diagnosis     ICD-10-CM    1  Osteoarthritis of both knees, unspecified osteoarthritis type  M17 0                   Subjective: Patient reported B knee soreness  Objective: See treatment diary below  Assessment: Muscle soreness at B LE after today's session, increased R knee pain with exercises  Trial of Ktape to right knee today with good tolerance  Plan: Continue per plan of care  Precautions: Bilateral knee pain aggravation        Manuals 3/22 3/30 4/4 4/6 4/11 4/14 4/18      K tape "U" R knee       MABEL                    Neuro Re-Ed                                                    Ther Ex             Nu step or recumbent bike  8 min 10 min  Nu step 10 min Nu step 10 min Recumbent bike x 10 min 10 min       Lumbar extension against parallel bar or counter top     3 x 10 3 x 10 30x                   LAQ:B: 10 x  3 sec x 20 20x 3#  3 sec x 20 with 3# 3 sec x 20 with 3# NT 20x 3#       Seated hip flexion:B:  2 x 10 20x 3#  3 sec x 20 with 3# NT NT 20x 3#                    Supine hamstring stretch:B:  NT 20sec 5x  20 sec x 5 NT 20SEC 5X  20sec 5x       Quad sets with slr flexion:B: 10 x  2 x 10 20x  2# x 20 2# x 20 20X 3SEC  NT      Heel slides:B: 10 x  2 x 10 20x  2 x 10 2# x 20 NT NT      Bridges 10 x  2 x 10 20x 3sec  3 sec x 20 3 sec x 20 20X 2SEC  NT      SAQ:B:   20x 3sec 3#  3# x 20 2# x 20 with 3 sec hold 3# 3SEC 20X  20X 3SEC 3#                    Side lying hip abduction:B:   NT NT NT NT NT      Prone hip extension:B:   NT NT NT NT NT      Prone TKE:B:   NT NT NT NT NT      Prone knee flexion:B:   NT NT NT NT NT      CURLS B        20X 3#       Standing slr x 3:B:  2 x 10 20X 3#  3# x 20 3# x 20 20X 3#  20X 3#       Standing hr and tr:B:  2 x 10 20X 3#  3# x 20 2 x 10 20X  30X      Mini squats to chair  2 x 10 20X 3#  2 x 10 2 x 10 20X  30X       SLS B       3SEC 10X       Lunges of foam:B:  NT NT 2 x 10 with 3# 3# x 20 NT NT        Tandem stance on foam:B:  30 sec x 2 30SEC 5X 30 sec x 4 30 sec x 4 NT 10X 10SEC       Side stepping and tandem ambulation on foam  4 x in parallel bars 6X  3# x 6 on foam 3# x 4  Each direction on foam 6X on foam   3#  NT                   Forward step ups:B: 6"  20X  2 x 10 on 6" NT 20x 3# 8" 20X 3#       Lateral step ups:B: 6"  NT 2 x 10 on 6" NT 10x B   8" 3#  20X 3#       Step downs:B: 4"  NT NT NT NT NT                                 Ther Activity                                       Gait Training                                       Modalities             MHP to bilateral knees while seated PRN

## 2022-04-21 ENCOUNTER — OFFICE VISIT (OUTPATIENT)
Dept: PHYSICAL THERAPY | Age: 82
End: 2022-04-21
Payer: MEDICARE

## 2022-04-21 DIAGNOSIS — M17.0 OSTEOARTHRITIS OF BOTH KNEES, UNSPECIFIED OSTEOARTHRITIS TYPE: Primary | ICD-10-CM

## 2022-04-21 PROCEDURE — 97110 THERAPEUTIC EXERCISES: CPT | Performed by: PHYSICAL THERAPIST

## 2022-04-21 NOTE — PROGRESS NOTES
Daily Note     Today's date: 2022  Patient name: Elaine Arevalo  : 1940  MRN: [id number]  Referring provider: Graham Rain MD  Dx:   Encounter Diagnosis     ICD-10-CM    1  Osteoarthritis of both knees, unspecified osteoarthritis type  M17 0                   Subjective: Patient without c/o prior to treatment session  Objective: See treatment diary below  Assessment: Patient stated some pain in her right knee with completion of activities; declined taping this visit  Plan: Continue per plan of care  Precautions: Bilateral knee pain aggravation        Manuals 3/22 3/30 4/4 4/6 4/11 4/14 4/18 4/21     K tape "U" R knee       MABEL                    Neuro Re-Ed                                                    Ther Ex             Nu step or recumbent bike  8 min 10 min  Nu step 10 min Nu step 10 min Recumbent bike x 10 min 10 min  10 min     Lumbar extension against parallel bar or counter top     3 x 10 3 x 10 30x 30x                  LAQ:B: 10 x  3 sec x 20 20x 3#  3 sec x 20 with 3# 3 sec x 20 with 3# NT 20x 3#  20x 3#      Seated hip flexion:B:  2 x 10 20x 3#  3 sec x 20 with 3# NT NT 20x 3#  20x 3#                   Supine hamstring stretch:B:  NT 20sec 5x  20 sec x 5 NT 20SEC 5X  20sec 5x  20sec 5x      Quad sets with slr flexion:B: 10 x  2 x 10 20x  2# x 20 2# x 20 20X 3SEC  NT      Heel slides:B: 10 x  2 x 10 20x  2 x 10 2# x 20 NT NT      Bridges 10 x  2 x 10 20x 3sec  3 sec x 20 3 sec x 20 20X 2SEC  NT      SAQ:B:   20x 3sec 3#  3# x 20 2# x 20 with 3 sec hold 3# 3SEC 20X  20X 3SEC 3#  20X 3SEC 3#                   Side lying hip abduction:B:   NT NT NT NT NT 3# 20x ea     Prone hip extension:B:   NT NT NT NT NT      Prone TKE:B:   NT NT NT NT NT      Prone knee flexion:B:   NT NT NT NT NT      CURLS B        20X 3#       Standing slr x 3:B:  2 x 10 20X 3#  3# x 20 3# x 20 20X 3#  20X 3#  20X 3#      Standing hr and tr:B:  2 x 10 20X 3#  3# x 20 2 x 10 20X  30X 30x Mini squats to chair  2 x 10 20X 3#  2 x 10 2 x 10 20X  30X  30x     SLS B       3SEC 10X  10x3"     Lunges of foam:B:  NT NT 2 x 10 with 3# 3# x 20 NT NT        Tandem stance on foam:B:  30 sec x 2 30SEC 5X 30 sec x 4 30 sec x 4 NT 10X 10SEC       Side stepping and tandem ambulation on foam  4 x in parallel bars 6X  3# x 6 on foam 3# x 4  Each direction on foam 6X on foam   3#  NT 6X on foam   3#                   Forward step ups:B: 6"  20X  2 x 10 on 6" NT 20x 3# 8" 20X 3#  20X 3#      Lateral step ups:B: 6"  NT 2 x 10 on 6" NT 10x B   8" 3#  20X 3#  20X 3#      Step downs:B: 4"  NT NT NT NT NT                                 Ther Activity                                       Gait Training                                       Modalities             MHP to bilateral knees while seated PRN

## 2022-04-27 ENCOUNTER — OFFICE VISIT (OUTPATIENT)
Dept: PHYSICAL THERAPY | Age: 82
End: 2022-04-27
Payer: MEDICARE

## 2022-04-27 DIAGNOSIS — M17.0 OSTEOARTHRITIS OF BOTH KNEES, UNSPECIFIED OSTEOARTHRITIS TYPE: Primary | ICD-10-CM

## 2022-04-27 PROCEDURE — 97110 THERAPEUTIC EXERCISES: CPT

## 2022-04-27 NOTE — PROGRESS NOTES
Daily Note     Today's date: 2022  Patient name: Leyda Guerrier  : 1940  MRN: [id number]  Referring provider: David Camacho MD  Dx:   Encounter Diagnosis     ICD-10-CM    1  Osteoarthritis of both knees, unspecified osteoarthritis type  M17 0                   Subjective: Patient noted no pain today "the swelling at my right knee went down"       Objective: See treatment diary below  Assessment: Good tolerance to exercises  Progress as able       Plan: Continue per plan of care  Precautions: Bilateral knee pain aggravation        Manuals 3/22 3/30 4/4 4/6 4/11 4/14 4/18 4/21 4/27    K tape "U" R knee       MABEL   MABEL                 Neuro Re-Ed                                                    Ther Ex             Nu step or recumbent bike  8 min 10 min  Nu step 10 min Nu step 10 min Recumbent bike x 10 min 10 min  10 min 10 MIN     Lumbar extension against parallel bar or counter top     3 x 10 3 x 10 30x 30x 30X                 LAQ:B: 10 x  3 sec x 20 20x 3#  3 sec x 20 with 3# 3 sec x 20 with 3# NT 20x 3#  20x 3#  20X 3#    Seated hip flexion:B:  2 x 10 20x 3#  3 sec x 20 with 3# NT NT 20x 3#  20x 3#  20X 3#                 Supine hamstring stretch:B:  NT 20sec 5x  20 sec x 5 NT 20SEC 5X  20sec 5x  20sec 5x  20SEC 5X     Quad sets with slr flexion:B: 10 x  2 x 10 20x  2# x 20 2# x 20 20X 3SEC  NT      Heel slides:B: 10 x  2 x 10 20x  2 x 10 2# x 20 NT NT      Bridges 10 x  2 x 10 20x 3sec  3 sec x 20 3 sec x 20 20X 2SEC  NT      SAQ:B:   20x 3sec 3#  3# x 20 2# x 20 with 3 sec hold 3# 3SEC 20X  20X 3SEC 3#  20X 3SEC 3#  20X 3SEC                  Side lying hip abduction:B:   NT NT NT NT NT 3# 20x ea NT     Prone hip extension:B:   NT NT NT NT NT  NT     Prone TKE:B:   NT NT NT NT NT  NT     Prone knee flexion:B:   NT NT NT NT NT  NT     CURLS B        20X 3#   20X 3#    Standing slr x 3:B:  2 x 10 20X 3#  3# x 20 3# x 20 20X 3#  20X 3#  20X 3#  20X 3#    Standing hr and tr:B:  2 x 10 20X 3#  3# x 20 2 x 10 20X  30X 30x 20X    Mini squats to chair  2 x 10 20X 3#  2 x 10 2 x 10 20X  30X  30x 30X    SLS B       3SEC 10X  10x3" NT    Lunges of foam:B:  NT NT 2 x 10 with 3# 3# x 20 NT NT  NT      Tandem stance on foam:B:  30 sec x 2 30SEC 5X 30 sec x 4 30 sec x 4 NT 10X 10SEC   NT    Side stepping and tandem ambulation on foam  4 x in parallel bars 6X  3# x 6 on foam 3# x 4  Each direction on foam 6X on foam   3#  NT 6X on foam   3#  6X                  Forward step ups:B: 6"  20X  2 x 10 on 6" NT 20x 3# 8" 20X 3#  20X 3#  20X 3#    Lateral step ups:B: 6"  NT 2 x 10 on 6" NT 10x B   8" 3#  20X 3#  20X 3#  20X 3#    Step downs:B: 4"  NT NT NT NT NT   NT                              Ther Activity                                       Gait Training                                       Modalities             MHP to bilateral knees while seated PRN

## 2022-04-29 ENCOUNTER — OFFICE VISIT (OUTPATIENT)
Dept: PHYSICAL THERAPY | Age: 82
End: 2022-04-29
Payer: MEDICARE

## 2022-04-29 DIAGNOSIS — M17.0 OSTEOARTHRITIS OF BOTH KNEES, UNSPECIFIED OSTEOARTHRITIS TYPE: Primary | ICD-10-CM

## 2022-04-29 PROCEDURE — 97110 THERAPEUTIC EXERCISES: CPT

## 2022-04-29 NOTE — PROGRESS NOTES
Daily Note     Today's date: 2022  Patient name: Ailin Edwards  : 1940  MRN: [id number]  Referring provider: Karan Altamirano MD  Dx:   Encounter Diagnosis     ICD-10-CM    1  Osteoarthritis of both knees, unspecified osteoarthritis type  M17 0                    Subjective: Pt reported improved B knee pain today  Objective: See treatment diary below  Assessment: Good tolerance to exercises today  Plan: Continue per plan of care  Precautions: Bilateral knee pain aggravation        Manuals            K tape "U" R knee         MABEL NT                Neuro Re-Ed                                                    Ther Ex             Nu step or recumbent bike         10 MIN  10 MIN    Lumbar extension against parallel bar or counter top         30X 40X                 LAQ:B:          20X 3# 20X 3#    Seated hip flexion:B:         20X 3# 20X 3#                 Supine hamstring stretch:B:         20SEC 5X  20SEC 5X    Quad sets with slr flexion:B:           20X 3SEC    Heel slides:B:          NT   Bridges             SAQ:B:         20X 3SEC  20X 3SEC                 Side lying hip abduction:B:          NT  20X 3#    Prone hip extension:B:         NT  20X 3#    Prone TKE:B:         NT  20X 3SEC    Prone knee flexion:B:         NT  20X 3#    CURLS B          20X 3# 20X 3#    Standing slr x 3:B:         20X 3# 20X 3#    Standing hr and tr:B:         20X 20X    Mini squats to chair         30X 20X    SLS B         NT NT   Lunges of foam:B:          NT NT     Tandem stance on foam:B:         NT NT   Side stepping and tandem ambulation on foam         6X  6X                 Forward step ups:B:          20X 3# 20X 3#    Lateral step ups:B:         20X 3# 20X 3#    Step downs:B:         NT                              Ther Activity                                       Gait Training                                       Modalities             MHP to bilateral knees while seated PRN

## 2022-05-04 ENCOUNTER — OFFICE VISIT (OUTPATIENT)
Dept: PHYSICAL THERAPY | Age: 82
End: 2022-05-04
Payer: MEDICARE

## 2022-05-04 DIAGNOSIS — M17.0 OSTEOARTHRITIS OF BOTH KNEES, UNSPECIFIED OSTEOARTHRITIS TYPE: Primary | ICD-10-CM

## 2022-05-04 PROCEDURE — 97110 THERAPEUTIC EXERCISES: CPT | Performed by: PHYSICAL THERAPIST

## 2022-05-04 PROCEDURE — 97750 PHYSICAL PERFORMANCE TEST: CPT | Performed by: PHYSICAL THERAPIST

## 2022-05-04 NOTE — LETTER
May 17, 2022    Rubi Greenberg MD  [address] [address]    Patient: Tanya Leblanc   YOB: 1940   Date of Visit: 2022     Encounter Diagnosis     ICD-10-CM    1  Osteoarthritis of both knees, unspecified osteoarthritis type  M17 0        Dear Dr Jaky Willingham: Thank you for your recent referral of Tanya Leblanc  Please review the attached evaluation summary from Sasha's recent visit  Please verify that you agree with the plan of care by signing the attached order  If you have any questions or concerns, please do not hesitate to call  I sincerely appreciate the opportunity to share in the care of one of your patients and hope to have another opportunity to work with you in the near future  Sincerely,    Torrey Whitley, PT      Referring Provider:      I certify that I have read the below Plan of Care and certify the need for these services furnished under this plan of treatment while under my care  Rubi Greenberg MD  [address] [address]  Via Fax: [phone]          PT Evaluation  / PT Reassessment    Today's date: 2022  Patient name: Tanya Leblanc  : 1940  MRN: [id number]  Referring provider: Carina Lindsey MD  Dx:   Encounter Diagnosis     ICD-10-CM    1  Osteoarthritis of both knees, unspecified osteoarthritis type  M17 0                   Assessment  Assessment details: PT Reassessment: 22  Patient reported the following progress since onset of PT:  Decrease in bilateral knee pain, decrease in lumbar spine pain, bilateral le strength, walking improved and standing activities like cooking and cleaning  But, she noted the following deficits that still persist: stair climbing, bilateral knee pain, bilateral le weakness, prolonged walking activities  PT IE: 22  Patient reported onset of bilateral knee pain that is chronic but has increased over the past 1-2 weeks  Patient noted her right knee is more painful than left  Patient noted her right knee does swell more than left  Patient noted the following deficits due to bilateral knee pain: static standing > 5 - 10 minutes, kneeling, squatting, prolonged standing, stair ascent more challenging than descent and picking up items off the floor  Patient denies limping due to bilateral knee pain  Patient noted pain on right knee is medial and inferior while left knee pain is medial as well  Patient noted she still has to perform her daily chores due to not having anyone else to help  Patient noted the following as pain reducing: Voltaren gel  Patient had x-ray on each knee that was + for arthritic changes  Patient noted bilateral knee pain does not limit her sleep  Patient noted right le is weaker than left  Patient noted she has not fallen  Patient noted she will use a brace intermittently  Impairments: abnormal gait, abnormal or restricted ROM, abnormal movement, activity intolerance, lacks appropriate home exercise program and pain with function  Understanding of Dx/Px/POC: excellent   Prognosis: good  Prognosis details: Patient is a 80y o  year old female seen for outpatient PT reevaluation with pain, mobility and functional deficits due to bilateral knee pain secondary to DJD  Patient presents to PT on reassessment with the following progress since onset of PT: decrease in bilateral knee pain, increase in bilateral le strength, gait, stair climbing, static standing activities and functional progress  But, patient presents to PT reassessment with the following problems, concerns, deficits and impairments: bilateral knee pain, decreased bilateral le range of motion, decreased bilateral le strength, right knee edema, gait and stair dysfuncitons, functional limitations and decreased tolerance to activity    Patient would benefit from skilled PT services under the following PT treatment plan to address the above noted deficits: therapeutic exercises and activities to facilitate bilateral le rom and strength, modalities, manual therapy techniques, gait and stair training, transfer training, balance and proprioception activities, IASTM techniques, Kinesio taping techniques and a hep  Thank you for the referral      Goals  Short Term goals - 4 weeks  1  Patient will be independent HEP  MET  2   Patient will report a 25 - 50% decrease in pain complaints  MET  3   Increase strength 1/2 grade  MET  4   Increase ROM 5-10 degrees  MET  Long Term goals - 8 weeks  1  Patient will report elimination of pain complaints  Partially MET  2   Patient will return to all recreational activities without restriction  Partially MET  3   ROM WFL  Partially MET  4   Strength 5/5  Partially MET  5   Patient will report static standing activities improved by > 15 minutes prior to bilateral knee symptoms or limitations  Partially MET  6   Patient will report she is able to resume kneeling activities in Denominational without bilateral knee symptoms or limitations  Partially MET  7   Patient will report ability to squat during house hold activities without bilateral knee symptoms or limitations  Partially MET  8   Patient will report ability to improve stair ascent in which she is able to perform in a reciprocal pattern  Partially MET  9   Patient will report the ability to pick things up off the floor without bilateral knee symptoms or limitations  Partially MET        Plan  Patient would benefit from: skilled physical therapy  Planned modality interventions: cryotherapy, TENS, thermotherapy: hydrocollator packs, ultrasound and unattended electrical stimulation  Planned therapy interventions: joint mobilization, manual therapy, massage, neuromuscular re-education, aquatic therapy, balance, balance/weight bearing training, body mechanics training, compression, self care, patient education, strengthening, stretching, therapeutic exercise, therapeutic activities, therapeutic training, transfer training, flexibility, functional ROM exercises, graded activity, gait training, graded exercise, graded motor and home exercise program  Frequency: 2x week  Duration in weeks: 8  Treatment plan discussed with: patient        Subjective Evaluation    History of Present Illness  Mechanism of injury: Patient's PMHx is remarkable for hernia surgery x 2 and ulcer  RIGHT KNEE     INDICATION:   M25 561: Pain in right knee  M25 562: Pain in left knee      COMPARISON:  None     VIEWS:  XR KNEE 3 VW RIGHT NON INJURY         FINDINGS:     There is no acute fracture or dislocation      There is no joint effusion      Moderate to severe osteoarthrosis of the medial femorotibial compartment with joint space narrowing and osteophytosis      Moderate osteoarthrosis of the lateral femorotibial and patellofemoral compartments with small osteophytes      No lytic or blastic osseous lesion      There are atherosclerotic calcifications  Soft tissues are otherwise unremarkable      IMPRESSION:  Tricompartmental osteoarthrosis of the right knee most pronounced at the medial femorotibial compartment  No acute osseous abnormality  LEFT KNEE     INDICATION:   M25 561: Pain in right knee  M25 562: Pain in left knee      COMPARISON:  None     VIEWS:  XR KNEE 3 VW LEFT NON INJURY         FINDINGS:     There is no acute fracture or dislocation      There is no joint effusion      Moderate to severe osteoarthrosis of the medial femorotibial compartment with joint space narrowing and osteophytosis      Mild osteoarthrosis of the lateral femorotibial and patellofemoral compartments with small osteophytes      No lytic or blastic osseous lesion      There are atherosclerotic calcifications  Soft tissues are otherwise unremarkable      IMPRESSION:  Tricompartmental osteoarthrosis of the left knee most pronounced at the medial femorotibial compartment    No acute osseous abnormality      Pain  At best pain ratin  At worst pain ratin  Location: bilateral knee      Diagnostic Tests  X-ray: abnormal  Patient Goals  Patient goals for therapy: decreased pain, decreased edema, increased strength, independence with ADLs/IADLs and increased motion          Objective     Tenderness     Additional Tenderness Details  Patient is - TTP at bilateral knee joint  Active Range of Motion   Left Hip   Flexion: 130 degrees   Abduction: 22 degrees     Right Hip   Flexion: 128 degrees   Abduction: 18 degrees   Left Knee   Flexion: 122 degrees   Extension: -5 degrees   Extensor la degrees     Right Knee   Flexion: 106 degrees with pain  Extension: -6 degrees with pain  Extensor la degrees   Left Ankle/Foot   Dorsiflexion (ke): 8 degrees   Plantar flexion: 46 degrees     Right Ankle/Foot   Dorsiflexion (ke): 6 degrees   Plantar flexion: 44 degrees     Additional Active Range of Motion Details  Hamstring mobility on right at degrees and left at 64 degrees    Strength/Myotome Testing     Left Hip   Planes of Motion   Flexion: 4+  Extension: 4+  Abduction: 4+  Adduction: 5    Right Hip   Planes of Motion   Flexion: 4+  Extension: 4+  Abduction: 4+  Adduction: 5    Left Knee   Flexion: 4+  Extension: 4+    Right Knee   Flexion: 4+  Extension: 4+    Left Ankle/Foot   Dorsiflexion: 4+  Plantar flexion: 5    Right Ankle/Foot   Dorsiflexion: 4+  Plantar flexion: 5    Ambulation     Ambulation: Level Surfaces   Ambulation without assistive device: independent    Additional Level Surfaces Ambulation Details  Patient ambulates with decrease in right knee extension with mid stance phase of gait  Ambulation: Stairs   Ascend stairs: independent  Pattern: reciprocal  Railings: two rails  Descend stairs: independent  Pattern: non-reciprocal  Railings: two rails    General Comments:      Knee Comments  Girth Measurements:  Knee:  Suprapatellar region: Right at 38 0 CM and left at 37 0 CM;   Mid patellar region: Right at 38 6 CM and left at 37 6 Cm;  Infrapatellar region: Right at 36 8 CM and left at 35 5Cm  Precautions: Bilateral knee pain aggravation        Manuals 5/4 4/27 4/29   K tape "U" R knee NT        MABEL NT                Neuro Re-Ed                                                    Ther Ex             Nu step or recumbent bike 10 min        10 MIN  10 MIN    Lumbar extension against parallel bar or counter top         30X 40X                 LAQ:B:  3# x 20        20X 3# 20X 3#    Seated hip flexion:B: 3# x 20        20X 3# 20X 3#                 Supine hamstring stretch:B: NT        20SEC 5X  20SEC 5X    Quad sets with slr flexion:B:  NT         20X 3SEC    Heel slides:B: NT         NT   Bridges NT            SAQ:B: NT        20X 3SEC  20X 3SEC                 Side lying hip abduction:B: NT         NT  20X 3#    Prone hip extension:B: NT        NT  20X 3#    Prone TKE:B: NT        NT  20X 3SEC    Prone knee flexion:B: NT        NT  20X 3#    CURLS B  NT        20X 3# 20X 3#    Standing slr x 3:B: 3# x 20        20X 3# 20X 3#    Standing hr and tr:B: 3 x 10        20X 20X    Mini squats to chair 3 x 10        30X 20X    SLS B 30 sec x 2 on foam        NT NT   Lunges of foam:B: 3 x 10         NT NT     Tandem stance on foam:B: 30 sec x 2 on foam        NT NT   Side stepping and tandem ambulation on foam 6 x with 3# on bilateral le        6X  6X                 Forward step ups:B:  8" x 20 with 3#        20X 3# 20X 3#    Lateral step ups:B: 8" x 20 with 3#        20X 3# 20X 3#    Step downs:B: NT        NT NT                             Ther Activity                                       Gait Training                                       Modalities             MHP to bilateral knees while seated PRN

## 2022-05-04 NOTE — PROGRESS NOTES
PT Evaluation  / PT Reassessment    Today's date: 2022  Patient name: Bashir Edmond  : 1940  MRN: [id number]  Referring provider: David Mackey MD  Dx:   Encounter Diagnosis     ICD-10-CM    1  Osteoarthritis of both knees, unspecified osteoarthritis type  M17 0                   Assessment  Assessment details: PT Reassessment: 22  Patient reported the following progress since onset of PT:  Decrease in bilateral knee pain, decrease in lumbar spine pain, bilateral le strength, walking improved and standing activities like cooking and cleaning  But, she noted the following deficits that still persist: stair climbing, bilateral knee pain, bilateral le weakness, prolonged walking activities  PT IE: 22  Patient reported onset of bilateral knee pain that is chronic but has increased over the past 1-2 weeks  Patient noted her right knee is more painful than left  Patient noted her right knee does swell more than left  Patient noted the following deficits due to bilateral knee pain: static standing > 5 - 10 minutes, kneeling, squatting, prolonged standing, stair ascent more challenging than descent and picking up items off the floor  Patient denies limping due to bilateral knee pain  Patient noted pain on right knee is medial and inferior while left knee pain is medial as well  Patient noted she still has to perform her daily chores due to not having anyone else to help  Patient noted the following as pain reducing: Voltaren gel  Patient had x-ray on each knee that was + for arthritic changes  Patient noted bilateral knee pain does not limit her sleep  Patient noted right le is weaker than left  Patient noted she has not fallen  Patient noted she will use a brace intermittently    Impairments: abnormal gait, abnormal or restricted ROM, abnormal movement, activity intolerance, lacks appropriate home exercise program and pain with function  Understanding of Dx/Px/POC: excellent Prognosis: good  Prognosis details: Patient is a 80y o  year old female seen for outpatient PT reevaluation with pain, mobility and functional deficits due to bilateral knee pain secondary to DJD  Patient presents to PT on reassessment with the following progress since onset of PT: decrease in bilateral knee pain, increase in bilateral le strength, gait, stair climbing, static standing activities and functional progress  But, patient presents to PT reassessment with the following problems, concerns, deficits and impairments: bilateral knee pain, decreased bilateral le range of motion, decreased bilateral le strength, right knee edema, gait and stair dysfuncitons, functional limitations and decreased tolerance to activity  Patient would benefit from skilled PT services under the following PT treatment plan to address the above noted deficits: therapeutic exercises and activities to facilitate bilateral le rom and strength, modalities, manual therapy techniques, gait and stair training, transfer training, balance and proprioception activities, IASTM techniques, Kinesio taping techniques and a hep  Thank you for the referral      Goals  Short Term goals - 4 weeks  1  Patient will be independent HEP  MET  2   Patient will report a 25 - 50% decrease in pain complaints  MET  3   Increase strength 1/2 grade  MET  4   Increase ROM 5-10 degrees  MET  Long Term goals - 8 weeks  1  Patient will report elimination of pain complaints  Partially MET  2   Patient will return to all recreational activities without restriction  Partially MET  3   ROM WFL  Partially MET  4   Strength 5/5  Partially MET  5   Patient will report static standing activities improved by > 15 minutes prior to bilateral knee symptoms or limitations  Partially MET  6   Patient will report she is able to resume kneeling activities in [place] without bilateral knee symptoms or limitations  Partially MET    7   Patient will report ability to squat during house hold activities without bilateral knee symptoms or limitations  Partially MET  8   Patient will report ability to improve stair ascent in which she is able to perform in a reciprocal pattern  Partially MET  9   Patient will report the ability to pick things up off the floor without bilateral knee symptoms or limitations  Partially MET  Plan  Patient would benefit from: skilled physical therapy  Planned modality interventions: cryotherapy, TENS, thermotherapy: hydrocollator packs, ultrasound and unattended electrical stimulation  Planned therapy interventions: joint mobilization, manual therapy, massage, neuromuscular re-education, aquatic therapy, balance, balance/weight bearing training, body mechanics training, compression, self care, patient education, strengthening, stretching, therapeutic exercise, therapeutic activities, therapeutic training, transfer training, flexibility, functional ROM exercises, graded activity, gait training, graded exercise, graded motor and home exercise program  Frequency: 2x week  Duration in weeks: 8  Treatment plan discussed with: patient        Subjective Evaluation    History of Present Illness  Mechanism of injury: Patient's PMHx is remarkable for hernia surgery x 2 and ulcer  RIGHT KNEE     INDICATION:   M25 561: Pain in right knee  M25 562: Pain in left knee      COMPARISON:  None     VIEWS:  XR KNEE 3 VW RIGHT NON INJURY         FINDINGS:     There is no acute fracture or dislocation      There is no joint effusion      Moderate to severe osteoarthrosis of the medial femorotibial compartment with joint space narrowing and osteophytosis      Moderate osteoarthrosis of the lateral femorotibial and patellofemoral compartments with small osteophytes      No lytic or blastic osseous lesion      There are atherosclerotic calcifications   Soft tissues are otherwise unremarkable      IMPRESSION:  Tricompartmental osteoarthrosis of the right knee most pronounced at the medial femorotibial compartment  No acute osseous abnormality  LEFT KNEE     INDICATION:   M25 561: Pain in right knee  M25 562: Pain in left knee      COMPARISON:  None     VIEWS:  XR KNEE 3 VW LEFT NON INJURY         FINDINGS:     There is no acute fracture or dislocation      There is no joint effusion      Moderate to severe osteoarthrosis of the medial femorotibial compartment with joint space narrowing and osteophytosis      Mild osteoarthrosis of the lateral femorotibial and patellofemoral compartments with small osteophytes      No lytic or blastic osseous lesion      There are atherosclerotic calcifications  Soft tissues are otherwise unremarkable      IMPRESSION:  Tricompartmental osteoarthrosis of the left knee most pronounced at the medial femorotibial compartment  No acute osseous abnormality      Pain  At best pain ratin  At worst pain ratin  Location: bilateral knee      Diagnostic Tests  X-ray: abnormal  Patient Goals  Patient goals for therapy: decreased pain, decreased edema, increased strength, independence with ADLs/IADLs and increased motion          Objective     Tenderness     Additional Tenderness Details  Patient is - TTP at bilateral knee joint      Active Range of Motion   Left Hip   Flexion: 130 degrees   Abduction: 22 degrees     Right Hip   Flexion: 128 degrees   Abduction: 18 degrees   Left Knee   Flexion: 122 degrees   Extension: -5 degrees   Extensor la degrees     Right Knee   Flexion: 106 degrees with pain  Extension: -6 degrees with pain  Extensor la degrees   Left Ankle/Foot   Dorsiflexion (ke): 8 degrees   Plantar flexion: 46 degrees     Right Ankle/Foot   Dorsiflexion (ke): 6 degrees   Plantar flexion: 44 degrees     Additional Active Range of Motion Details  Hamstring mobility on right at degrees and left at 64 degrees    Strength/Myotome Testing     Left Hip   Planes of Motion   Flexion: 4+  Extension: 4+  Abduction: 4+  Adduction: 5    Right Hip Planes of Motion   Flexion: 4+  Extension: 4+  Abduction: 4+  Adduction: 5    Left Knee   Flexion: 4+  Extension: 4+    Right Knee   Flexion: 4+  Extension: 4+    Left Ankle/Foot   Dorsiflexion: 4+  Plantar flexion: 5    Right Ankle/Foot   Dorsiflexion: 4+  Plantar flexion: 5    Ambulation     Ambulation: Level Surfaces   Ambulation without assistive device: independent    Additional Level Surfaces Ambulation Details  Patient ambulates with decrease in right knee extension with mid stance phase of gait  Ambulation: Stairs   Ascend stairs: independent  Pattern: reciprocal  Railings: two rails  Descend stairs: independent  Pattern: non-reciprocal  Railings: two rails    General Comments:      Knee Comments  Girth Measurements:  Knee:  Suprapatellar region: Right at 38 0 CM and left at 37 0 CM; Mid patellar region: Right at 38 6 CM and left at 37 6 Cm;  Infrapatellar region: Right at 36 8 CM and left at 35 5Cm  Precautions: Bilateral knee pain aggravation        Manuals 5/4 4/27 4/29   K tape "U" R knee NT        MABEL NT                Neuro Re-Ed                                                    Ther Ex             Nu step or recumbent bike 10 min        10 MIN  10 MIN    Lumbar extension against parallel bar or counter top         30X 40X                 LAQ:B:  3# x 20        20X 3# 20X 3#    Seated hip flexion:B: 3# x 20        20X 3# 20X 3#                 Supine hamstring stretch:B: NT        20SEC 5X  20SEC 5X    Quad sets with slr flexion:B:  NT         20X 3SEC    Heel slides:B: NT         NT   Bridges NT            SAQ:B: NT        20X 3SEC  20X 3SEC                 Side lying hip abduction:B: NT         NT  20X 3#    Prone hip extension:B: NT        NT  20X 3#    Prone TKE:B: NT        NT  20X 3SEC    Prone knee flexion:B: NT        NT  20X 3#    CURLS B  NT        20X 3# 20X 3#    Standing slr x 3:B: 3# x 20        20X 3# 20X 3#    Standing hr and tr:B: 3 x 10        20X 20X    Mini squats to chair 3 x 10        30X 20X    SLS B 30 sec x 2 on foam        NT NT   Lunges of foam:B: 3 x 10         NT NT     Tandem stance on foam:B: 30 sec x 2 on foam        NT NT   Side stepping and tandem ambulation on foam 6 x with 3# on bilateral le        6X  6X                 Forward step ups:B:  8" x 20 with 3#        20X 3# 20X 3#    Lateral step ups:B: 8" x 20 with 3#        20X 3# 20X 3#    Step downs:B: NT        NT NT                             Ther Activity                                       Gait Training                                       Modalities             MHP to bilateral knees while seated PRN

## 2022-05-06 ENCOUNTER — OFFICE VISIT (OUTPATIENT)
Dept: PHYSICAL THERAPY | Age: 82
End: 2022-05-06
Payer: MEDICARE

## 2022-05-06 DIAGNOSIS — M17.0 OSTEOARTHRITIS OF BOTH KNEES, UNSPECIFIED OSTEOARTHRITIS TYPE: Primary | ICD-10-CM

## 2022-05-06 PROCEDURE — 97110 THERAPEUTIC EXERCISES: CPT

## 2022-05-06 NOTE — PROGRESS NOTES
Daily Note     Today's date: 2022  Patient name: Bashir Edmond  : 1940  MRN: [id number]  Referring provider: David Mackey MD  Dx:   Encounter Diagnosis     ICD-10-CM    1  Osteoarthritis of both knees, unspecified osteoarthritis type  M17 0              Subjective: Pt reported improved B knee pain  Objective: See treatment diary below      Assessment: Fatigue intermittently  Plan: Cont with Plan of care     Precautions: Bilateral knee pain aggravation        Manuals    K tape "U" R knee NT NT       MABEL NT                Neuro Re-Ed                                                    Ther Ex             Nu step or recumbent bike 10 min 10 min        10 MIN  10 MIN    Lumbar extension against parallel bar or counter top  30x        30X 40X                 LAQ:B:  3# x 20 20x 3#        20X 3# 20X 3#    Seated hip flexion:B: 3# x 20 20x 3#        20X 3# 20X 3#                 Supine hamstring stretch:B: NT 20sec 5x        20SEC 5X  20SEC 5X    Quad sets with slr flexion:B:  NT 30x 3sec         20X 3SEC    Heel slides:B: NT 20x         NT   Bridges NT 20x 3sec            SAQ:B: NT 20x 3sec 3#        20X 3SEC  20X 3SEC                 Side lying hip abduction:B: NT  NT       NT  20X 3#    Prone hip extension:B: NT 20X 3#        NT  20X 3#    Prone TKE:B: NT 10X         NT  20X 3SEC    Prone knee flexion:B: NT 20X        NT  20X 3#    CURLS B  NT 20X        20X 3# 20X 3#    Standing slr x 3:B: 3# x 20 20X 3#        20X 3# 20X 3#    Standing hr and tr:B: 3 x 10 30X        20X 20X    Mini squats to chair 3 x 10 20X        30X 20X    SLS B 30 sec x 2 on foam NT       NT NT   Lunges of foam:B: 3 x 10  NT       NT NT     Tandem stance on foam:B: 30 sec x 2 on foam NT       NT NT   Side stepping and tandem ambulation on foam 6 x with 3# on bilateral le 6X 3#   On foam        6X  6X                 Forward step ups:B:  8" x 20 with 3# 20x 8" 3#        20X 3# 20X 3#    Lateral step ups:B: 8" x 20 with 3# 20x 8" 3#        20X 3# 20X 3#    Step downs:B: NT NT       NT NT                             Ther Activity                                       Gait Training                                       Modalities             MHP to bilateral knees while seated PRN  NT

## 2022-05-09 ENCOUNTER — OFFICE VISIT (OUTPATIENT)
Dept: PHYSICAL THERAPY | Age: 82
End: 2022-05-09
Payer: MEDICARE

## 2022-05-09 DIAGNOSIS — M17.0 OSTEOARTHRITIS OF BOTH KNEES, UNSPECIFIED OSTEOARTHRITIS TYPE: Primary | ICD-10-CM

## 2022-05-09 PROCEDURE — 97110 THERAPEUTIC EXERCISES: CPT | Performed by: PHYSICAL THERAPIST

## 2022-05-09 NOTE — PROGRESS NOTES
Daily Note     Today's date: 2022  Patient name: Bashir Edmond  : 1940  MRN: [id number]  Referring provider: David Mackey MD  Dx:   Encounter Diagnosis     ICD-10-CM    1  Osteoarthritis of both knees, unspecified osteoarthritis type  M17 0              Subjective: Patient reported Bilateral knee pain is at minimal pain, but tightness and stiffness is more limiting to functional activities  Objective: See treatment diary below      Assessment: Patient presents with bilateral le fatigue after prolonged and repeated closed kinetic chain activities that mimics standing based functional activities  Thus, PT is warranted to facilitate bilateral le strnegth and endurance to decrease fatigue related symptoms and promote standing based functional activities  Plan: Cont with Plan of care     Precautions: Bilateral knee pain aggravation        Manuals    K tape "U" R knee NT NT NT      MABEL NT                Neuro Re-Ed                                                    Ther Ex             Nu step or recumbent bike 10 min 10 min  10 min bike      10 MIN  10 MIN    Lumbar extension against parallel bar or counter top  30x  3 x 10      30X 40X                 LAQ:B:  3# x 20 20x 3#  4# x 20      20X 3# 20X 3#    Seated hip flexion:B: 3# x 20 20x 3#  4# x 20      20X 3# 20X 3#                 Supine hamstring stretch:B: NT 20sec 5x  NT      20SEC 5X  20SEC 5X    Quad sets with slr flexion:B:  NT 30x 3sec  With slr flexion 4# x 20:B:       20X 3SEC    Heel slides:B: NT 20x  NT       NT   Bridges NT 20x 3sec            SAQ:B: NT 20x 3sec 3#  4# x 20 with 3 seconds      20X 3SEC  20X 3SEC                 Side lying hip abduction:B: NT  NT NT      NT  20X 3#    Prone hip extension:B: NT 20X 3#  NT      NT  20X 3#    Prone TKE:B: NT 10X   NT      NT  20X 3SEC    Prone knee flexion:B: NT 20X  NT      NT  20X 3#    CURLS B  NT 20X  NT      20X 3# 20X 3#    Standing slr x 3:B: 3# x 20 20X 3#  4# x 20      20X 3# 20X 3#    Standing hr and tr:B: 3 x 10 30X  3 x 10 with 4#      20X 20X    Mini squats to chair 3 x 10 20X  2 x 10      30X 20X    SLS B 30 sec x 2 on foam NT 30 sec x 2 on foam      NT NT   Lunges of foam:B: 3 x 10  NT 2 x 10      NT NT     Tandem stance on foam:B: 30 sec x 2 on foam NT 30 sec x 2 on foam      NT NT   Side stepping and tandem ambulation on foam 6 x with 3# on bilateral le 6X 3#   On foam  6x on foam with 4#      6X  6X                 Forward step ups:B:  8" x 20 with 3# 20x 8" 3#  8" x 20 with 4#      20X 3# 20X 3#    Lateral step ups:B: 8" x 20 with 3# 20x 8" 3#  8" x 20 with 4#      20X 3# 20X 3#    Step downs:B: NT NT NT      NT NT                             Ther Activity                                       Gait Training                                       Modalities             MHP to bilateral knees while seated PRN  NT

## 2022-05-12 ENCOUNTER — OFFICE VISIT (OUTPATIENT)
Dept: PHYSICAL THERAPY | Age: 82
End: 2022-05-12
Payer: MEDICARE

## 2022-05-12 DIAGNOSIS — M17.0 OSTEOARTHRITIS OF BOTH KNEES, UNSPECIFIED OSTEOARTHRITIS TYPE: Primary | ICD-10-CM

## 2022-05-12 PROCEDURE — 97110 THERAPEUTIC EXERCISES: CPT

## 2022-05-12 NOTE — PROGRESS NOTES
Daily Note     Today's date: 2022  Patient name: Dorota Carmona  : 1940  MRN: [id number]  Referring provider: Shannon Aguilera MD  Dx:   Encounter Diagnosis     ICD-10-CM    1  Osteoarthritis of both knees, unspecified osteoarthritis type  M17 0              Subjective: Patient reported "my low back and knees are sore today"      Objective: See treatment diary below      Assessment: Increased B Knee pain with prolonged standing and walking  Pt encouraged to start short walks outdoors  Plan: Cont with Plan of care     Precautions: Bilateral knee pain aggravation        Manuals    K tape "U" R knee NT NT NT NT     MABEL NT                Neuro Re-Ed                                                    Ther Ex             Nu step or recumbent bike 10 min 10 min  10 min bike 10 MIN      10 MIN  10 MIN    Lumbar extension against parallel bar or counter top  30x  3 x 10 4X10      30X 40X                 LAQ:B:  3# x 20 20x 3#  4# x 20 20X 4#      20X 3# 20X 3#    Seated hip flexion:B: 3# x 20 20x 3#  4# x 20 30X 4#      20X 3# 20X 3#                 Supine hamstring stretch:B: NT 20sec 5x  NT 20SEC 5X      20SEC 5X  20SEC 5X    Quad sets with slr flexion:B:  NT 30x 3sec  With slr flexion 4# x 20:B: 30X 4#       20X 3SEC    Heel slides:B: NT 20x  NT NT      NT   Bridges NT 20x 3sec   20X 3SEC          SAQ:B: NT 20x 3sec 3#  4# x 20 with 3 seconds 30X 3SEC 4#      20X 3SEC  20X 3SEC                 Side lying hip abduction:B: NT  NT NT NT     NT  20X 3#    Prone hip extension:B: NT 20X 3#  NT 20X      NT  20X 3#    Prone TKE:B: NT 10X   NT NT     NT  20X 3SEC    Prone knee flexion:B: NT 20X  NT 20X 4#      NT  20X 3#    CURLS B  NT 20X  NT 20X 4#      20X 3# 20X 3#    Standing slr x 3:B: 3# x 20 20X 3#  4# x 20 20X 4#      20X 3# 20X 3#    Standing hr and tr:B: 3 x 10 30X  3 x 10 with 4# 20X      20X 20X    Mini squats to chair 3 x 10 20X  2 x 10 30X      30X 20X    SLS B 30 sec x 2 on foam NT 30 sec x 2 on foam NT     NT NT   Lunges of foam:B: 3 x 10  NT 2 x 10 NT     NT NT     Tandem stance on foam:B: 30 sec x 2 on foam NT 30 sec x 2 on foam NT     NT NT   Side stepping and tandem ambulation on foam 6 x with 3# on bilateral le 6X 3#   On foam  6x on foam with 4# 6X      6X  6X                 Forward step ups:B:  8" x 20 with 3# 20x 8" 3#  8" x 20 with 4# 20X 8" 4#      20X 3# 20X 3#    Lateral step ups:B: 8" x 20 with 3# 20x 8" 3#  8" x 20 with 4# 20X 8" 4#      20X 3# 20X 3#    Step downs:B: NT NT NT NT     NT NT                             Ther Activity                                       Gait Training                                       Modalities             MHP to bilateral knees while seated PRN  NT

## 2022-05-16 ENCOUNTER — OFFICE VISIT (OUTPATIENT)
Dept: PHYSICAL THERAPY | Age: 82
End: 2022-05-16
Payer: MEDICARE

## 2022-05-16 DIAGNOSIS — M17.0 OSTEOARTHRITIS OF BOTH KNEES, UNSPECIFIED OSTEOARTHRITIS TYPE: Primary | ICD-10-CM

## 2022-05-16 PROCEDURE — 97110 THERAPEUTIC EXERCISES: CPT | Performed by: PHYSICAL THERAPIST

## 2022-05-16 NOTE — PROGRESS NOTES
Daily Note     Today's date: 2022  Patient name: Reid Da Silva  : 1940  MRN: [id number]  Referring provider: Monty Nickerson MD  Dx:   Encounter Diagnosis     ICD-10-CM    1  Osteoarthritis of both knees, unspecified osteoarthritis type  M17 0              Subjective: Patient reported she has intermittent bilateral knee pain and soreness  Objective: See treatment diary below  Assessment: Patient presents with bilateral le fatigue during closed kinetic chain activities  Patient presents with bilateral le fatigue, not pain production with open kinetic chain activities as well  Patient's weakness and fatigue during PT treatment mimics standing based functional deficits  Thus, PT is warranted to facilitate bilateral le strength and pain reduction to promote full functional improvements  Plan: Cont with Plan of care     Precautions: Bilateral knee pain aggravation        Manuals         K tape "U" R knee NT NT NT NT NT                     Neuro Re-Ed                                                    Ther Ex             Nu step or recumbent bike 10 min 10 min  10 min bike 10 MIN  10 min        Lumbar extension against parallel bar or counter top  30x  3 x 10 4X10  4 x 10                     LAQ:B:  3# x 20 20x 3#  4# x 20 20X 4#  4# x 20        Seated hip flexion:B: 3# x 20 20x 3#  4# x 20 30X 4#  4# x 30                     Supine hamstring stretch:B: NT 20sec 5x  NT 20SEC 5X  NT        Quad sets with slr flexion:B:  NT 30x 3sec  With slr flexion 4# x 20:B: 30X 4#  4# x 20        Heel slides:B: NT 20x  NT NT NT        Bridges NT 20x 3sec   20X 3SEC  3 sec x 20        SAQ:B: NT 20x 3sec 3#  4# x 20 with 3 seconds 30X 3SEC 4#  4# x 20                     Side lying hip abduction:B: NT  NT NT NT NT        Prone hip extension:B: NT 20X 3#  NT 20X  4# x 20        Prone TKE:B: NT 10X   NT NT NT        Prone knee flexion:B: NT 20X  NT 20X 4#  4# x 20        CURLS B NT 20X  NT 20X 4#  standing 4# x 20        Standing slr x 3:B: 3# x 20 20X 3#  4# x 20 20X 4#  4# x 20        Standing hr and tr:B: 3 x 10 30X  3 x 10 with 4# 20X  2 x 10        Mini squats to chair 3 x 10 20X  2 x 10 30X  3 x 10        SLS B 30 sec x 2 on foam NT 30 sec x 2 on foam NT 30 sec x 2        Lunges of foam:B: 3 x 10  NT 2 x 10 NT 3 x 10          Tandem stance on foam:B: 30 sec x 2 on foam NT 30 sec x 2 on foam NT 30 sec x 2        Side stepping and tandem ambulation on foam 6 x with 3# on bilateral le 6X 3#   On foam  6x on foam with 4# 6X  6 x                      Forward step ups:B:  8" x 20 with 3# 20x 8" 3#  8" x 20 with 4# 20X 8" 4#  8" x 20        Lateral step ups:B: 8" x 20 with 3# 20x 8" 3#  8" x 20 with 4# 20X 8" 4#  8" x 20        Step downs:B: NT NT NT NT NT                                  Ther Activity                                       Gait Training                                       Modalities             MHP to bilateral knees while seated PRN  NT

## 2022-05-18 ENCOUNTER — OFFICE VISIT (OUTPATIENT)
Dept: PHYSICAL THERAPY | Age: 82
End: 2022-05-18
Payer: MEDICARE

## 2022-05-18 DIAGNOSIS — M17.0 OSTEOARTHRITIS OF BOTH KNEES, UNSPECIFIED OSTEOARTHRITIS TYPE: Primary | ICD-10-CM

## 2022-05-18 PROCEDURE — 97110 THERAPEUTIC EXERCISES: CPT | Performed by: PHYSICAL THERAPIST

## 2022-05-18 NOTE — PROGRESS NOTES
Daily Note     Today's date: 2022  Patient name: Elaine Arevalo  : 1940  MRN: [id number]  Referring provider: Graham Rain MD  Dx:   Encounter Diagnosis     ICD-10-CM    1  Osteoarthritis of both knees, unspecified osteoarthritis type  M17 0              Subjective: Patient reported she has intermittent bilateral knee pain and soreness, but she noted currently weakness and fatigue limits prolonged standing activities more than pain  Objective: See treatment diary below  Assessment: Patient presents with continuous progression in bilateral strength during both open and closed kinetic chains  But, she exhibits fatigue with repeated standing based therapeutic exercises which mimics standing based functional activities  Thus, PT is warranted to facilitate bilateral le strength and pain reduction to promote full functional improvements  Plan: Cont with Plan of care  Precautions: Bilateral knee pain aggravation        Manuals        K tape "U" R knee NT NT NT NT NT NT                    Neuro Re-Ed                                                    Ther Ex             Nu step or recumbent bike 10 min 10 min  10 min bike 10 MIN  10 min 10 min       Lumbar extension against parallel bar or counter top  30x  3 x 10 4X10  4 x 10 4 x 10                    LAQ:B:  3# x 20 20x 3#  4# x 20 20X 4#  4# x 20 4# x 20       Seated hip flexion:B: 3# x 20 20x 3#  4# x 20 30X 4#  4# x 30 4# x 20                    Supine hamstring stretch:B: NT 20sec 5x  NT 20SEC 5X  NT NT       Quad sets with slr flexion:B:  NT 30x 3sec  With slr flexion 4# x 20:B: 30X 4#  4# x 20 4# x 20       Heel slides:B: NT 20x  NT NT NT NT       Bridges NT 20x 3sec   20X 3SEC  3 sec x 20 3 sec x 20       SAQ:B: NT 20x 3sec 3#  4# x 20 with 3 seconds 30X 3SEC 4#  4# x 20 4# x 20                    Side lying hip abduction:B: NT  NT NT NT NT NT       Prone hip extension:B: NT 20X 3#  NT 20X 4# x 20 NT       Prone TKE:B: NT 10X   NT NT NT NT       Prone knee flexion:B: NT 20X  NT 20X 4#  4# x 20 NT       CURLS B  NT 20X  NT 20X 4#  standing 4# x 20 4# x 30       Standing slr x 3:B: 3# x 20 20X 3#  4# x 20 20X 4#  4# x 20 4# x 30       Standing hr and tr:B: 3 x 10 30X  3 x 10 with 4# 20X  2 x 10 2 x 10       Mini squats to chair 3 x 10 20X  2 x 10 30X  3 x 10 3 x 10       SLS B 30 sec x 2 on foam NT 30 sec x 2 on foam NT 30 sec x 2 30 sec x 2       Lunges of foam:B: 3 x 10  NT 2 x 10 NT 3 x 10 3 x 10 with 4#         Tandem stance on foam:B: 30 sec x 2 on foam NT 30 sec x 2 on foam NT 30 sec x 2 30 sec x 2       Side stepping and tandem ambulation on foam 6 x with 3# on bilateral le 6X 3#   On foam  6x on foam with 4# 6X  6 x  8 x                    Forward step ups:B:  8" x 20 with 3# 20x 8" 3#  8" x 20 with 4# 20X 8" 4#  8" x 20 8" x 20 with 4#       Lateral step ups:B: 8" x 20 with 3# 20x 8" 3#  8" x 20 with 4# 20X 8" 4#  8" x 20 8" x 20 with 4#       Step downs:B: NT NT NT NT NT NT                                 Ther Activity                                       Gait Training                                       Modalities             MHP to bilateral knees while seated PRN  NT

## 2022-05-23 ENCOUNTER — OFFICE VISIT (OUTPATIENT)
Dept: PHYSICAL THERAPY | Age: 82
End: 2022-05-23
Payer: MEDICARE

## 2022-05-23 DIAGNOSIS — M17.0 OSTEOARTHRITIS OF BOTH KNEES, UNSPECIFIED OSTEOARTHRITIS TYPE: Primary | ICD-10-CM

## 2022-05-23 PROCEDURE — 97110 THERAPEUTIC EXERCISES: CPT

## 2022-05-23 PROCEDURE — 97140 MANUAL THERAPY 1/> REGIONS: CPT

## 2022-05-23 NOTE — PROGRESS NOTES
Daily Note     Today's date: 2022  Patient name: Essie Shipman  : 1940  MRN: [id number]  Referring provider: Joselyn Rich MD  Dx:   Encounter Diagnosis     ICD-10-CM    1  Osteoarthritis of both knees, unspecified osteoarthritis type  M17 0              Subjective: Patient reported intermittent B kneee pain today "its my back that is killing me"         Objective: See treatment diary below  Assessment: Good tolerance to all exercises, Intermittent B knee pain with prolonged standing  Plan: Cont with Plan of care  Precautions: Bilateral knee pain aggravation        Manuals       K tape "U" R knee NT NT NT NT NT NT NT                   Neuro Re-Ed                                                    Ther Ex             Nu step or recumbent bike 10 min 10 min  10 min bike 10 MIN  10 min 10 min 10 min       Lumbar extension against parallel bar or counter top  30x  3 x 10 4X10  4 x 10 4 x 10 20x                    LAQ:B:  3# x 20 20x 3#  4# x 20 20X 4#  4# x 20 4# x 20 4# 20x       Seated hip flexion:B: 3# x 20 20x 3#  4# x 20 30X 4#  4# x 30 4# x 20 4# 20x                    Supine hamstring stretch:B: NT 20sec 5x  NT 20SEC 5X  NT NT 20sec 5x       Quad sets with slr flexion:B:  NT 30x 3sec  With slr flexion 4# x 20:B: 30X 4#  4# x 20 4# x 20 20x 4#       Heel slides:B: NT 20x  NT NT NT NT NT      Bridges NT 20x 3sec   20X 3SEC  3 sec x 20 3 sec x 20 20X 3SEC       SAQ:B: NT 20x 3sec 3#  4# x 20 with 3 seconds 30X 3SEC 4#  4# x 20 4# x 20 20X 4#                    Side lying hip abduction:B: NT  NT NT NT NT NT NT      Prone hip extension:B: NT 20X 3#  NT 20X  4# x 20 NT NT      Prone TKE:B: NT 10X   NT NT NT NT NT      Prone knee flexion:B: NT 20X  NT 20X 4#  4# x 20 NT NT      CURLS B  NT 20X  NT 20X 4#  standing 4# x 20 4# x 30 30X 4#       Standing slr x 3:B: 3# x 20 20X 3#  4# x 20 20X 4#  4# x 20 4# x 30 30X 4#       Standing hr and tr:B: 3 x 10 30X 3 x 10 with 4# 20X  2 x 10 2 x 10 20X       Mini squats to chair 3 x 10 20X  2 x 10 30X  3 x 10 3 x 10 30X       SLS B 30 sec x 2 on foam NT 30 sec x 2 on foam NT 30 sec x 2 30 sec x 2 30SEC       Lunges of foam:B: 3 x 10  NT 2 x 10 NT 3 x 10 3 x 10 with 4# NT        Tandem stance on foam:B: 30 sec x 2 on foam NT 30 sec x 2 on foam NT 30 sec x 2 30 sec x 2 30SEC 4X       Side stepping and tandem ambulation on foam 6 x with 3# on bilateral le 6X 3#   On foam  6x on foam with 4# 6X  6 x  8 x 8X                    Forward step ups:B:  8" x 20 with 3# 20x 8" 3#  8" x 20 with 4# 20X 8" 4#  8" x 20 8" x 20 with 4# 8" 20X 4#       Lateral step ups:B: 8" x 20 with 3# 20x 8" 3#  8" x 20 with 4# 20X 8" 4#  8" x 20 8" x 20 with 4# 8"   20X 4#       Step downs:B: NT NT NT NT NT NT NT                                Ther Activity                                       Gait Training                                       Modalities             MHP to bilateral knees while seated PRN  NT

## 2022-05-26 ENCOUNTER — OFFICE VISIT (OUTPATIENT)
Dept: PHYSICAL THERAPY | Age: 82
End: 2022-05-26
Payer: MEDICARE

## 2022-05-26 DIAGNOSIS — M17.0 OSTEOARTHRITIS OF BOTH KNEES, UNSPECIFIED OSTEOARTHRITIS TYPE: Primary | ICD-10-CM

## 2022-05-26 PROCEDURE — 97110 THERAPEUTIC EXERCISES: CPT

## 2022-05-26 NOTE — PROGRESS NOTES
Daily Note     Today's date: 2022  Patient name: Oziel Story  : 1940  MRN: [id number]  Referring provider: Miguel Mccann MD  Dx:   Encounter Diagnosis     ICD-10-CM    1  Osteoarthritis of both knees, unspecified osteoarthritis type  M17 0              Subjective: Patient reported my knees hurt all the time but definitely less than before"        Objective: See treatment diary below  Assessment: Fair tolerance to exercises, delayed on set soreness after PT sessions  Plan: Cont with Plan of care  Precautions: Bilateral knee pain aggravation        Manuals      K tape "U" R knee NT NT NT NT NT NT NT NT                  Neuro Re-Ed                                                    Ther Ex             Nu step or recumbent bike 10 min 10 min  10 min bike 10 MIN  10 min 10 min 10 min  10 MIN      Lumbar extension against parallel bar or counter top  30x  3 x 10 4X10  4 x 10 4 x 10 20x                    LAQ:B:  3# x 20 20x 3#  4# x 20 20X 4#  4# x 20 4# x 20 4# 20x  20X 4#      Seated hip flexion:B: 3# x 20 20x 3#  4# x 20 30X 4#  4# x 30 4# x 20 4# 20x  20X 4#              20SEC 5X      Supine hamstring stretch:B: NT 20sec 5x  NT 20SEC 5X  NT NT 20sec 5x  20SEC 5X      Quad sets with slr flexion:B:  NT 30x 3sec  With slr flexion 4# x 20:B: 30X 4#  4# x 20 4# x 20 20x 4#  20X 4#      Heel slides:B: NT 20x  NT NT NT NT NT 20X     Bridges NT 20x 3sec   20X 3SEC  3 sec x 20 3 sec x 20 20X 3SEC  20X 3SEC      SAQ:B: NT 20x 3sec 3#  4# x 20 with 3 seconds 30X 3SEC 4#  4# x 20 4# x 20 20X 4#  20X 4#                   Side lying hip abduction:B: NT  NT NT NT NT NT NT 20X 4#      Prone hip extension:B: NT 20X 3#  NT 20X  4# x 20 NT NT 20X 4#      Prone TKE:B: NT 10X   NT NT NT NT NT NT     Prone knee flexion:B: NT 20X  NT 20X 4#  4# x 20 NT NT 20X 4#      CURLS B  NT 20X  NT 20X 4#  standing 4# x 20 4# x 30 30X 4#  20X 4#      Standing slr x 3:B: 3# x 20 20X 3#  4# x 20 20X 4#  4# x 20 4# x 30 30X 4#  20X 4#      Standing hr and tr:B: 3 x 10 30X  3 x 10 with 4# 20X  2 x 10 2 x 10 20X  20X     Mini squats to chair 3 x 10 20X  2 x 10 30X  3 x 10 3 x 10 30X  20X     SLS B 30 sec x 2 on foam NT 30 sec x 2 on foam NT 30 sec x 2 30 sec x 2 30SEC  NT     Lunges of foam:B: 3 x 10  NT 2 x 10 NT 3 x 10 3 x 10 with 4# NT NT       Tandem stance on foam:B: 30 sec x 2 on foam NT 30 sec x 2 on foam NT 30 sec x 2 30 sec x 2 30SEC 4X  NT     Side stepping and tandem ambulation on foam 6 x with 3# on bilateral le 6X 3#   On foam  6x on foam with 4# 6X  6 x  8 x 8X  6X 4#                   Forward step ups:B:  8" x 20 with 3# 20x 8" 3#  8" x 20 with 4# 20X 8" 4#  8" x 20 8" x 20 with 4# 8" 20X 4#  20X 8"  4#      Lateral step ups:B: 8" x 20 with 3# 20x 8" 3#  8" x 20 with 4# 20X 8" 4#  8" x 20 8" x 20 with 4# 8"   20X 4#  20X 8"   4#      Step downs:B: NT NT NT NT NT NT NT NT                                Ther Activity                                       Gait Training                                       Modalities             MHP to bilateral knees while seated PRN  NT

## 2022-05-31 ENCOUNTER — OFFICE VISIT (OUTPATIENT)
Dept: PHYSICAL THERAPY | Age: 82
End: 2022-05-31
Payer: MEDICARE

## 2022-05-31 DIAGNOSIS — M17.0 OSTEOARTHRITIS OF BOTH KNEES, UNSPECIFIED OSTEOARTHRITIS TYPE: Primary | ICD-10-CM

## 2022-05-31 PROCEDURE — 97110 THERAPEUTIC EXERCISES: CPT

## 2022-05-31 NOTE — PROGRESS NOTES
Daily Note     Today's date: 2022  Patient name: Carlotta Molina  : 1940  MRN: [id number]  Referring provider: Florian Wright MD  Dx:   Encounter Diagnosis     ICD-10-CM    1  Osteoarthritis of both knees, unspecified osteoarthritis type  M17 0        Start Time: 1000  Stop Time: 1100  Subjective: Patient reported 2/10 pain at L hip and right knee today         Objective: See treatment diary below  Assessment: Improved mobility after today's session  Decreased pain after today's session  Progress as able       Plan: Cont with Plan of care  Precautions: Bilateral knee pain aggravation        Manuals     K tape "U" R knee NT NT NT NT NT NT NT NT                  Neuro Re-Ed                                                    Ther Ex             Nu step or recumbent bike 10 min 10 min  10 min bike 10 MIN  10 min 10 min 10 min  10 MIN  10 min    Lumbar extension against parallel bar or counter top  30x  3 x 10 4X10  4 x 10 4 x 10 20x   20x                  LAQ:B:  3# x 20 20x 3#  4# x 20 20X 4#  4# x 20 4# x 20 4# 20x  20X 4#  20x 4#     Seated hip flexion:B: 3# x 20 20x 3#  4# x 20 30X 4#  4# x 30 4# x 20 4# 20x  20X 4#  20x 4#             20SEC 5X      Supine hamstring stretch:B: NT 20sec 5x  NT 20SEC 5X  NT NT 20sec 5x  20SEC 5X  20sec 5x     Quad sets with slr flexion:B:  NT 30x 3sec  With slr flexion 4# x 20:B: 30X 4#  4# x 20 4# x 20 20x 4#  20X 4#  20x 4#     Heel slides:B: NT 20x  NT NT NT NT NT 20X 20x     Bridges NT 20x 3sec   20X 3SEC  3 sec x 20 3 sec x 20 20X 3SEC  20X 3SEC  20x 3sec     SAQ:B: NT 20x 3sec 3#  4# x 20 with 3 seconds 30X 3SEC 4#  4# x 20 4# x 20 20X 4#  20X 4#  20x 4#                  Side lying hip abduction:B: NT  NT NT NT NT NT NT 20X 4#  20x 4#     Prone hip extension:B: NT 20X 3#  NT 20X  4# x 20 NT NT 20X 4#  20x 4#     Prone TKE:B: NT 10X   NT NT NT NT NT NT     Prone knee flexion:B: NT 20X  NT 20X 4#  4# x 20 NT NT 20X 4#  20x 4#     CURLS B  NT 20X  NT 20X 4#  standing 4# x 20 4# x 30 30X 4#  20X 4#  20x 4#     Standing slr x 3:B: 3# x 20 20X 3#  4# x 20 20X 4#  4# x 20 4# x 30 30X 4#  20X 4#  20x 4#     Standing hr and tr:B: 3 x 10 30X  3 x 10 with 4# 20X  2 x 10 2 x 10 20X  20X 20x     Mini squats to chair 3 x 10 20X  2 x 10 30X  3 x 10 3 x 10 30X  20X 20x     SLS B 30 sec x 2 on foam NT 30 sec x 2 on foam NT 30 sec x 2 30 sec x 2 30SEC  NT NT    Lunges of foam:B: 3 x 10  NT 2 x 10 NT 3 x 10 3 x 10 with 4# NT NT NT      Tandem stance on foam:B: 30 sec x 2 on foam NT 30 sec x 2 on foam NT 30 sec x 2 30 sec x 2 30SEC 4X  NT NT    Side stepping and tandem ambulation on foam 6 x with 3# on bilateral le 6X 3#   On foam  6x on foam with 4# 6X  6 x  8 x 8X  6X 4#  6X 4#                  Forward step ups:B:  8" x 20 with 3# 20x 8" 3#  8" x 20 with 4# 20X 8" 4#  8" x 20 8" x 20 with 4# 8" 20X 4#  20X 8"  4#  20X NT    Lateral step ups:B: 8" x 20 with 3# 20x 8" 3#  8" x 20 with 4# 20X 8" 4#  8" x 20 8" x 20 with 4# 8"   20X 4#  20X 8"   4#  20X   NT    Step downs:B: NT NT NT NT NT NT NT NT                                Ther Activity                                       Gait Training                                       Modalities             MHP to bilateral knees while seated PRN  NT

## 2022-06-02 ENCOUNTER — OFFICE VISIT (OUTPATIENT)
Dept: PHYSICAL THERAPY | Age: 82
End: 2022-06-02
Payer: MEDICARE

## 2022-06-02 DIAGNOSIS — M17.0 OSTEOARTHRITIS OF BOTH KNEES, UNSPECIFIED OSTEOARTHRITIS TYPE: Primary | ICD-10-CM

## 2022-06-02 PROCEDURE — 97110 THERAPEUTIC EXERCISES: CPT | Performed by: PHYSICAL THERAPIST

## 2022-06-02 NOTE — PROGRESS NOTES
Daily Note / PT Discharge    Today's date: 2022  Patient name: Bean Kahn  : 1940  MRN: [id number]  Referring provider: Bob Mohamud MD  Dx:   Encounter Diagnosis     ICD-10-CM    1  Osteoarthritis of both knees, unspecified osteoarthritis type  M17 0          Subjective: Patient reported bilateral knee and left hip pain is at a 2/10 pain  Patient agrees with PT POC to d/c to hep  Objective: See treatment diary below  Assessment: Patient presents with ability to resume all functional activities with minimal pain limitations while strength and mobility deficits are fully resolved  Patient presents with pain due to OA that does improve with repeated activities and mobility and is most limited with immediate movements after prolonged static positions  Thus, patient agrees with PT POC to d/c to hep  Therefore, patient provided with final hep and d/c to hep  Plan: Cont with Plan of care  Precautions: Bilateral knee pain aggravation        Manuals  6   K tape "U" R knee NT NT NT NT NT NT NT NT                  Neuro Re-Ed                                                    Ther Ex             Nu step or recumbent bike 10 min 10 min  10 min bike 10 MIN  10 min 10 min 10 min  10 MIN  10 min 10 min   Lumbar extension against parallel bar or counter top  30x  3 x 10 4X10  4 x 10 4 x 10 20x   20x  2 x 10                LAQ:B:  3# x 20 20x 3#  4# x 20 20X 4#  4# x 20 4# x 20 4# 20x  20X 4#  20x 4#  NT   Seated hip flexion:B: 3# x 20 20x 3#  4# x 20 30X 4#  4# x 30 4# x 20 4# 20x  20X 4#  20x 4#  NT           20SEC 5X      Supine hamstring stretch:B: 20 sec x 5 20sec 5x  NT 20SEC 5X  NT NT 20sec 5x  20SEC 5X  20sec 5x  NT   Quad sets with slr flexion:B:  NT 30x 3sec  With slr flexion 4# x 20:B: 30X 4#  4# x 20 4# x 20 20x 4#  20X 4#  20x 4#  NT   Heel slides:B: NT 20x  NT NT NT NT NT 20X 20x  NT   Bridges NT 20x 3sec   20X 3SEC  3 sec x 20 3 sec x 20 20X 3SEC  20X 3SEC  20x 3sec  NT   SAQ:B: NT 20x 3sec 3#  4# x 20 with 3 seconds 30X 3SEC 4#  4# x 20 4# x 20 20X 4#  20X 4#  20x 4#  NT                Side lying hip abduction:B: NT  NT NT NT NT NT NT 20X 4#  20x 4#  NT   Prone hip extension:B: NT 20X 3#  NT 20X  4# x 20 NT NT 20X 4#  20x 4#  NT   Prone TKE:B: NT 10X   NT NT NT NT NT NT     Prone knee flexion:B: NT 20X  NT 20X 4#  4# x 20 NT NT 20X 4#  20x 4#  NT   CURLS B  NT 20X  NT 20X 4#  standing 4# x 20 4# x 30 30X 4#  20X 4#  20x 4#  NT   Standing slr x 3:B: 3# x 20 20X 3#  4# x 20 20X 4#  4# x 20 4# x 30 30X 4#  20X 4#  20x 4#  4# x 20   Standing hr and tr:B: 3 x 10 30X  3 x 10 with 4# 20X  2 x 10 2 x 10 20X  20X 20x  2 x 10   Mini squats to chair 3 x 10 20X  2 x 10 30X  3 x 10 3 x 10 30X  20X 20x  2 x 10   SLS B 30 sec x 2 on foam NT 30 sec x 2 on foam NT 30 sec x 2 30 sec x 2 30SEC  NT NT 30 sec x 2 on foam   Lunges of foam:B: 3 x 10  NT 2 x 10 NT 3 x 10 3 x 10 with 4# NT NT NT 2 x 10 on foam     Tandem stance on foam:B: 30 sec x 2 on foam NT 30 sec x 2 on foam NT 30 sec x 2 30 sec x 2 30SEC 4X  NT NT 30 sec x 2 on foam   Side stepping and tandem ambulation on foam 6 x with 3# on bilateral le 6X 3#   On foam  6x on foam with 4# 6X  6 x  8 x 8X  6X 4#  6X 4#  5 x each direction on foam                Forward step ups:B:  8" x 20 with 3# 20x 8" 3#  8" x 20 with 4# 20X 8" 4#  8" x 20 8" x 20 with 4# 8" 20X 4#  20X 8"  4#  20X NT 8" x 20   Lateral step ups:B: 8" x 20 with 3# 20x 8" 3#  8" x 20 with 4# 20X 8" 4#  8" x 20 8" x 20 with 4# 8"   20X 4#  20X 8"   4#  20X   NT 8" x 20   Step downs:B: NT NT NT NT NT NT NT NT  NT NT                             Ther Activity                                       Gait Training                                       Modalities             MHP to bilateral knees while seated PRN  NT

## 2022-06-17 ENCOUNTER — APPOINTMENT (OUTPATIENT)
Dept: LAB | Age: 82
End: 2022-06-17
Payer: MEDICARE

## 2022-06-17 DIAGNOSIS — A69.20 LYME DISEASE: ICD-10-CM

## 2022-06-17 PROCEDURE — 86618 LYME DISEASE ANTIBODY: CPT

## 2022-06-17 PROCEDURE — 36415 COLL VENOUS BLD VENIPUNCTURE: CPT

## 2022-06-18 LAB — B BURGDOR IGG+IGM SER-ACNC: 0.3 AI

## 2022-07-13 ENCOUNTER — OFFICE VISIT (OUTPATIENT)
Dept: OBGYN CLINIC | Facility: HOSPITAL | Age: 82
End: 2022-07-13
Payer: MEDICARE

## 2022-07-13 VITALS
BODY MASS INDEX: 24.53 KG/M2 | SYSTOLIC BLOOD PRESSURE: 146 MMHG | DIASTOLIC BLOOD PRESSURE: 77 MMHG | HEART RATE: 62 BPM | HEIGHT: 65 IN | WEIGHT: 147.2 LBS

## 2022-07-13 DIAGNOSIS — M25.561 CHRONIC PAIN OF RIGHT KNEE: ICD-10-CM

## 2022-07-13 DIAGNOSIS — M17.12 PRIMARY OSTEOARTHRITIS OF LEFT KNEE: ICD-10-CM

## 2022-07-13 DIAGNOSIS — G89.29 CHRONIC PAIN OF LEFT KNEE: ICD-10-CM

## 2022-07-13 DIAGNOSIS — G89.29 CHRONIC PAIN OF RIGHT KNEE: ICD-10-CM

## 2022-07-13 DIAGNOSIS — M25.562 CHRONIC PAIN OF LEFT KNEE: ICD-10-CM

## 2022-07-13 DIAGNOSIS — M17.11 PRIMARY OSTEOARTHRITIS OF RIGHT KNEE: Primary | ICD-10-CM

## 2022-07-13 PROCEDURE — 99203 OFFICE O/P NEW LOW 30 MIN: CPT | Performed by: ORTHOPAEDIC SURGERY

## 2022-07-13 NOTE — PROGRESS NOTES
Assessment:  1  Primary osteoarthritis of right knee  Brace   2  Primary osteoarthritis of left knee  Brace   3  Chronic pain of right knee  Brace   4  Chronic pain of left knee  Brace       Plan:  Bilateral knee osteoarthritis  · Continue physical therapy  · Bilateral knee brace provided  · Hinged knee brace versus otc compression sleeve  · Steroid injection offered and declined  · Consider on follow up  · Follow up in 6 weeks    To do next visit:  Return in about 6 weeks (around 2022)  The above stated was discussed in layman's terms and the patient expressed understanding  All questions were answered to the patient's satisfaction  Scribe Attestation    I,:  Jerica Mauricio am acting as a scribe while in the presence of the attending physician :       I,:  Hernan Tinoco MD personally performed the services described in this documentation    as scribed in my presence :             Subjective: Gerard Mcelroy is a 80 y o  female who presents for initial evaluation of bilateral knees  She has had symptoms for several years without injury  Today she complains of right anterior and left generalized knee pain  Prolonged sitting and transitional positions aggravate while rest alleviates  She does use Tylenol and Voltaren gel with benefit  She has done recent physical therapy with benefit  She has had aspiration in past with benefit  She denies past knee surgery  Review of systems negative unless otherwise specified in HPI    History reviewed  No pertinent past medical history  Past Surgical History:   Procedure Laterality Date    HERNIA REPAIR         History reviewed  No pertinent family history  Social History     Occupational History    Not on file   Tobacco Use    Smoking status: Former Smoker     Quit date: 1994     Years since quittin 5    Smokeless tobacco: Never Used   Vaping Use    Vaping Use: Never used   Substance and Sexual Activity    Alcohol use:  Yes Alcohol/week: 7 0 standard drinks     Types: 7 Glasses of wine per week    Drug use: No    Sexual activity: Not on file         Current Outpatient Medications:     alendronate (FOSAMAX) 70 mg tablet, Take 70 mg by mouth every 7 days sunday  , Disp: , Rfl:     MULTIPLE VITAMIN PO, Take 1 tablet by mouth daily, Disp: , Rfl:     Allergies   Allergen Reactions    Aspirin GI Bleeding     Hx of ulcer    Penicillins             Vitals:    07/13/22 1058   BP: 146/77   Pulse: 62       Objective:  Physical exam  · General: Awake, Alert, Oriented  · Eyes: Pupils equal, round and reactive to light  · Heart: regular rate and rhythm  · Lungs: No audible wheezing  · Abdomen: soft                    Ortho Exam  Bilateral knees:  No erythema or ecchymosis  No effusion or swelling  Normal strength  Good ROM   Calf compartments soft and supple  Sensation intact  Toes are warm sensate and mobile      Diagnostics, reviewed and taken today if performed as documented: The attending physician has personally reviewed the pertinent films in PACS and interpretation is as follows:  Bilateral knee x-rays of 3/10/2022:  Each knee displays moderate medial and right > left mild-moderate patellofemoral arthritic changes  Procedures, if performed today:    Procedures    None performed      Portions of the record may have been created with voice recognition software  Occasional wrong word or "sound a like" substitutions may have occurred due to the inherent limitations of voice recognition software  Read the chart carefully and recognize, using context, where substitutions have occurred

## 2022-07-14 PROBLEM — M17.12 PRIMARY OSTEOARTHRITIS OF LEFT KNEE: Status: ACTIVE | Noted: 2022-07-14

## 2022-07-14 PROBLEM — M17.11 PRIMARY OSTEOARTHRITIS OF RIGHT KNEE: Status: ACTIVE | Noted: 2022-07-14

## 2022-08-26 ENCOUNTER — OFFICE VISIT (OUTPATIENT)
Dept: OBGYN CLINIC | Facility: HOSPITAL | Age: 82
End: 2022-08-26
Payer: MEDICARE

## 2022-08-26 VITALS
DIASTOLIC BLOOD PRESSURE: 79 MMHG | SYSTOLIC BLOOD PRESSURE: 147 MMHG | HEIGHT: 65 IN | WEIGHT: 147.4 LBS | HEART RATE: 62 BPM | BODY MASS INDEX: 24.56 KG/M2

## 2022-08-26 DIAGNOSIS — M17.11 PRIMARY OSTEOARTHRITIS OF RIGHT KNEE: Primary | ICD-10-CM

## 2022-08-26 DIAGNOSIS — M17.12 PRIMARY OSTEOARTHRITIS OF LEFT KNEE: ICD-10-CM

## 2022-08-26 PROCEDURE — 99213 OFFICE O/P EST LOW 20 MIN: CPT | Performed by: ORTHOPAEDIC SURGERY

## 2022-08-26 NOTE — PROGRESS NOTES
Orthopaedics Office Visit - Follow up Patient Visit    ASSESSMENT/PLAN:    Assessment:   Bilateral knee osteoarthritis     Plan:   Patient is doing well on exam today  Knee brace PRN  Activities as tolerated      _____________________________________________________  CHIEF COMPLAINT:  Chief Complaint   Patient presents with    Left Knee - Follow-up    Right Knee - Follow-up         SUBJECTIVE:  Gerard Mcelroy is a 80 y o  female who presents today for follow up of bilateral knee osteoarthritis  Patient provided with a hinged knee brace at the last visit  She declined CS injection  Patient was referred to PT  She states overall she is doing well, symptoms are tolerable  PAST MEDICAL HISTORY:  History reviewed  No pertinent past medical history  PAST SURGICAL HISTORY:  Past Surgical History:   Procedure Laterality Date    HERNIA REPAIR         FAMILY HISTORY:  History reviewed  No pertinent family history  SOCIAL HISTORY:  Social History     Tobacco Use    Smoking status: Former Smoker     Quit date: 1994     Years since quittin 6    Smokeless tobacco: Never Used   Vaping Use    Vaping Use: Never used   Substance Use Topics    Alcohol use: Yes     Alcohol/week: 7 0 standard drinks     Types: 7 Glasses of wine per week    Drug use: No       MEDICATIONS:    Current Outpatient Medications:     alendronate (FOSAMAX) 70 mg tablet, Take 70 mg by mouth every 7 days   , Disp: , Rfl:     MULTIPLE VITAMIN PO, Take 1 tablet by mouth daily, Disp: , Rfl:     ALLERGIES:  Allergies   Allergen Reactions    Aspirin GI Bleeding     Hx of ulcer    Penicillins        REVIEW OF SYSTEMS:  MSK: see below  Neuro: NVI  Pertinent items are otherwise noted in HPI  A comprehensive review of systems was otherwise negative      LABS:  HgA1c: No results found for: HGBA1C  BMP:   Lab Results   Component Value Date    GLUCOSE 83 10/13/2014    CALCIUM 9 7 2020     10/13/2014    K 4 5 2020 CO2 28 01/07/2020     (H) 01/07/2020    BUN 9 01/07/2020    CREATININE 0 62 01/07/2020     CBC: No components found for: CBC    _____________________________________________________  PHYSICAL EXAMINATION:  Vital signs: /79   Pulse 62   Ht 5' 5" (1 651 m)   Wt 66 9 kg (147 lb 6 4 oz)   BMI 24 53 kg/m²   General: No acute distress, awake and alert  Psychiatric: Mood and affect appear appropriate  HEENT: Trachea Midline, No torticollis, no apparent facial trauma  Cardiovascular: No audible murmurs; Extremities appear perfused  Pulmonary: No audible wheezing or stridor  Skin: No open lesions; see further details (if any) below    MUSCULOSKELETAL EXAMINATION:  Bilateral Knee Exam  Alignment:  Normal knee alignment  Inspection:  No swelling  No edema  No erythema  No ecchymosis  Palpation:  No tenderness  ROM:  Knee Extension 120  Knee Flexion 0  Strength:  Not tested  Stability:  No objective knee instability  Stable Varus / Valgus stress, Lachman, and Posterior drawer  Tests:  No pertinent positive or negative tests  Patella:  Patella tracks centrally with crepitus  _____________________________________________________  STUDIES REVIEWED:  I personally reviewed the images and interpretation is as follows:  Bilateral knee x-rays of 3/10/2022:  Each knee displays moderate medial and right > left mild-moderate patellofemoral arthritic changes      PROCEDURES PERFORMED:  Pablo Rojas - chet

## 2022-11-14 ENCOUNTER — LAB REQUISITION (OUTPATIENT)
Dept: LAB | Facility: HOSPITAL | Age: 82
End: 2022-11-14

## 2022-11-14 DIAGNOSIS — M81.0 AGE-RELATED OSTEOPOROSIS WITHOUT CURRENT PATHOLOGICAL FRACTURE: ICD-10-CM

## 2022-11-14 DIAGNOSIS — E78.5 HYPERLIPIDEMIA, UNSPECIFIED: ICD-10-CM

## 2022-11-14 DIAGNOSIS — K27.9 PEPTIC ULCER, SITE UNSPECIFIED, UNSPECIFIED AS ACUTE OR CHRONIC, WITHOUT HEMORRHAGE OR PERFORATION: ICD-10-CM

## 2022-11-14 LAB
ALBUMIN SERPL BCP-MCNC: 4 G/DL (ref 3.5–5)
ALP SERPL-CCNC: 70 U/L (ref 46–116)
ALT SERPL W P-5'-P-CCNC: 23 U/L (ref 12–78)
ANION GAP SERPL CALCULATED.3IONS-SCNC: 5 MMOL/L (ref 4–13)
AST SERPL W P-5'-P-CCNC: 17 U/L (ref 5–45)
BASOPHILS # BLD AUTO: 0.04 THOUSANDS/ÂΜL (ref 0–0.1)
BASOPHILS NFR BLD AUTO: 1 % (ref 0–1)
BILIRUB SERPL-MCNC: 0.75 MG/DL (ref 0.2–1)
BUN SERPL-MCNC: 12 MG/DL (ref 5–25)
CALCIUM SERPL-MCNC: 9.8 MG/DL (ref 8.3–10.1)
CHLORIDE SERPL-SCNC: 108 MMOL/L (ref 96–108)
CHOLEST SERPL-MCNC: 218 MG/DL
CO2 SERPL-SCNC: 24 MMOL/L (ref 21–32)
CREAT SERPL-MCNC: 0.56 MG/DL (ref 0.6–1.3)
EOSINOPHIL # BLD AUTO: 0.09 THOUSAND/ÂΜL (ref 0–0.61)
EOSINOPHIL NFR BLD AUTO: 2 % (ref 0–6)
ERYTHROCYTE [DISTWIDTH] IN BLOOD BY AUTOMATED COUNT: 13.3 % (ref 11.6–15.1)
GFR SERPL CREATININE-BSD FRML MDRD: 88 ML/MIN/1.73SQ M
GLUCOSE SERPL-MCNC: 89 MG/DL (ref 65–140)
HCT VFR BLD AUTO: 41.4 % (ref 34.8–46.1)
HDLC SERPL-MCNC: 69 MG/DL
HGB BLD-MCNC: 13.7 G/DL (ref 11.5–15.4)
IMM GRANULOCYTES # BLD AUTO: 0.02 THOUSAND/UL (ref 0–0.2)
IMM GRANULOCYTES NFR BLD AUTO: 0 % (ref 0–2)
LDLC SERPL CALC-MCNC: 128 MG/DL (ref 0–100)
LYMPHOCYTES # BLD AUTO: 1.25 THOUSANDS/ÂΜL (ref 0.6–4.47)
LYMPHOCYTES NFR BLD AUTO: 25 % (ref 14–44)
MCH RBC QN AUTO: 32.4 PG (ref 26.8–34.3)
MCHC RBC AUTO-ENTMCNC: 33.1 G/DL (ref 31.4–37.4)
MCV RBC AUTO: 98 FL (ref 82–98)
MONOCYTES # BLD AUTO: 0.48 THOUSAND/ÂΜL (ref 0.17–1.22)
MONOCYTES NFR BLD AUTO: 10 % (ref 4–12)
NEUTROPHILS # BLD AUTO: 3.11 THOUSANDS/ÂΜL (ref 1.85–7.62)
NEUTS SEG NFR BLD AUTO: 62 % (ref 43–75)
NONHDLC SERPL-MCNC: 149 MG/DL
NRBC BLD AUTO-RTO: 0 /100 WBCS
PLATELET # BLD AUTO: 193 THOUSANDS/UL (ref 149–390)
PMV BLD AUTO: 11.3 FL (ref 8.9–12.7)
POTASSIUM SERPL-SCNC: 3.9 MMOL/L (ref 3.5–5.3)
PROT SERPL-MCNC: 6.9 G/DL (ref 6.4–8.4)
RBC # BLD AUTO: 4.23 MILLION/UL (ref 3.81–5.12)
SODIUM SERPL-SCNC: 137 MMOL/L (ref 135–147)
TRIGL SERPL-MCNC: 103 MG/DL
WBC # BLD AUTO: 4.99 THOUSAND/UL (ref 4.31–10.16)

## 2023-01-27 ENCOUNTER — APPOINTMENT (EMERGENCY)
Dept: RADIOLOGY | Facility: HOSPITAL | Age: 83
End: 2023-01-27

## 2023-01-27 ENCOUNTER — HOSPITAL ENCOUNTER (EMERGENCY)
Facility: HOSPITAL | Age: 83
Discharge: HOME | End: 2023-01-27
Attending: EMERGENCY MEDICINE

## 2023-01-27 VITALS
TEMPERATURE: 97.7 F | HEART RATE: 66 BPM | OXYGEN SATURATION: 96 % | RESPIRATION RATE: 18 BRPM | DIASTOLIC BLOOD PRESSURE: 76 MMHG | SYSTOLIC BLOOD PRESSURE: 165 MMHG

## 2023-01-27 DIAGNOSIS — M54.6 LEFT-SIDED THORACIC BACK PAIN, UNSPECIFIED CHRONICITY: Primary | ICD-10-CM

## 2023-01-27 LAB
ALBUMIN SERPL BCP-MCNC: 3.7 G/DL (ref 3.5–5)
ALP SERPL-CCNC: 68 U/L (ref 46–116)
ALT SERPL W P-5'-P-CCNC: 27 U/L (ref 12–78)
ANION GAP SERPL CALCULATED.3IONS-SCNC: 4 MMOL/L (ref 4–13)
AST SERPL W P-5'-P-CCNC: 19 U/L (ref 5–45)
ATRIAL RATE: 66 BPM
BASOPHILS # BLD AUTO: 0.07 THOUSANDS/ÂΜL (ref 0–0.1)
BASOPHILS NFR BLD AUTO: 1 % (ref 0–1)
BILIRUB SERPL-MCNC: 0.47 MG/DL (ref 0.2–1)
BUN SERPL-MCNC: 12 MG/DL (ref 5–25)
CALCIUM SERPL-MCNC: 9.6 MG/DL (ref 8.3–10.1)
CARDIAC TROPONIN I PNL SERPL HS: 2 NG/L
CHLORIDE SERPL-SCNC: 108 MMOL/L (ref 96–108)
CO2 SERPL-SCNC: 26 MMOL/L (ref 21–32)
CREAT SERPL-MCNC: 0.58 MG/DL (ref 0.6–1.3)
EOSINOPHIL # BLD AUTO: 0.12 THOUSAND/ÂΜL (ref 0–0.61)
EOSINOPHIL NFR BLD AUTO: 2 % (ref 0–6)
ERYTHROCYTE [DISTWIDTH] IN BLOOD BY AUTOMATED COUNT: 12.8 % (ref 11.6–15.1)
GFR SERPL CREATININE-BSD FRML MDRD: 87 ML/MIN/1.73SQ M
GLUCOSE SERPL-MCNC: 100 MG/DL (ref 65–140)
HCT VFR BLD AUTO: 39.1 % (ref 34.8–46.1)
HGB BLD-MCNC: 12.9 G/DL (ref 11.5–15.4)
IMM GRANULOCYTES # BLD AUTO: 0.02 THOUSAND/UL (ref 0–0.2)
IMM GRANULOCYTES NFR BLD AUTO: 0 % (ref 0–2)
LYMPHOCYTES # BLD AUTO: 1.12 THOUSANDS/ÂΜL (ref 0.6–4.47)
LYMPHOCYTES NFR BLD AUTO: 21 % (ref 14–44)
MCH RBC QN AUTO: 32 PG (ref 26.8–34.3)
MCHC RBC AUTO-ENTMCNC: 33 G/DL (ref 31.4–37.4)
MCV RBC AUTO: 97 FL (ref 82–98)
MONOCYTES # BLD AUTO: 0.54 THOUSAND/ÂΜL (ref 0.17–1.22)
MONOCYTES NFR BLD AUTO: 10 % (ref 4–12)
NEUTROPHILS # BLD AUTO: 3.41 THOUSANDS/ÂΜL (ref 1.85–7.62)
NEUTS SEG NFR BLD AUTO: 66 % (ref 43–75)
NRBC BLD AUTO-RTO: 0 /100 WBCS
P AXIS: 49 DEGREES
PLATELET # BLD AUTO: 198 THOUSANDS/UL (ref 149–390)
PMV BLD AUTO: 10.3 FL (ref 8.9–12.7)
POTASSIUM SERPL-SCNC: 4.1 MMOL/L (ref 3.5–5.3)
PR INTERVAL: 156 MS
PROT SERPL-MCNC: 7 G/DL (ref 6.4–8.4)
QRS AXIS: -5 DEGREES
QRSD INTERVAL: 82 MS
QT INTERVAL: 400 MS
QTC INTERVAL: 419 MS
RBC # BLD AUTO: 4.03 MILLION/UL (ref 3.81–5.12)
SODIUM SERPL-SCNC: 138 MMOL/L (ref 135–147)
T WAVE AXIS: 43 DEGREES
VENTRICULAR RATE: 66 BPM
WBC # BLD AUTO: 5.28 THOUSAND/UL (ref 4.31–10.16)

## 2023-01-27 RX ORDER — KETOROLAC TROMETHAMINE 30 MG/ML
15 INJECTION, SOLUTION INTRAMUSCULAR; INTRAVENOUS ONCE
Status: DISCONTINUED | OUTPATIENT
Start: 2023-01-27 | End: 2023-01-27

## 2023-01-27 RX ORDER — KETOROLAC TROMETHAMINE 30 MG/ML
15 INJECTION, SOLUTION INTRAMUSCULAR; INTRAVENOUS ONCE
Status: COMPLETED | OUTPATIENT
Start: 2023-01-27 | End: 2023-01-27

## 2023-01-27 RX ORDER — ACETAMINOPHEN 325 MG/1
975 TABLET ORAL ONCE
Status: COMPLETED | OUTPATIENT
Start: 2023-01-27 | End: 2023-01-27

## 2023-01-27 RX ORDER — LIDOCAINE 50 MG/G
1 PATCH TOPICAL ONCE
Status: DISCONTINUED | OUTPATIENT
Start: 2023-01-27 | End: 2023-01-27 | Stop reason: HOSPADM

## 2023-01-27 RX ADMIN — KETOROLAC TROMETHAMINE 15 MG: 30 INJECTION, SOLUTION INTRAMUSCULAR; INTRAVENOUS at 11:18

## 2023-01-27 RX ADMIN — SODIUM CHLORIDE 500 ML: 0.9 INJECTION, SOLUTION INTRAVENOUS at 11:19

## 2023-01-27 RX ADMIN — LIDOCAINE 5% 1 PATCH: 700 PATCH TOPICAL at 11:18

## 2023-01-27 RX ADMIN — ACETAMINOPHEN 975 MG: 325 TABLET ORAL at 11:18

## 2023-01-27 NOTE — DISCHARGE INSTRUCTIONS
You were seen and evaluated in the emergency department for back pain  This is likely musculoskeletal in nature your heart enzyme is normal your EKG is normal nothing is wrong with your heart  Your CT scan shows no rib fractures additional findings of age-indeterminate compression fracture as discussed this is also not likely to be the cause of your pain  All other lab work was normal   500 mg Tylenol every 6 hours as needed for pain  Heating pads for pain    Continue with your daily activities as tolerated    Please follow-up with your primary care physician within 48 hours    Your symptoms worsen or persist please return to the emergency department for further evaluation and management

## 2023-01-27 NOTE — ED PROVIDER NOTES
History  Chief Complaint   Patient presents with   • Back Pain     Pt c/o back pain     Patient is an 73-year-old female no past medical history presenting to the ED for evaluation of gradual onset worsening nonradiating left-sided thoracic back pain is worse with movement and relieved with rest   Patient denies any acute inciting incident or trauma to the area  Reports that her symptoms started gradually do not radiate anywhere there is no associated focal deficits weakness chest pain shortness of breath abdominal pain nausea vomiting diarrhea fever chills or any other complaints  Of note patient reports that her  recently passed away she has been dealing with a lot of emotions and issues at home          Prior to Admission Medications   Prescriptions Last Dose Informant Patient Reported? Taking? MULTIPLE VITAMIN PO   Yes No   Sig: Take 1 tablet by mouth daily   alendronate (FOSAMAX) 70 mg tablet   Yes No   Sig: Take 70 mg by mouth every 7 days         Facility-Administered Medications: None       History reviewed  No pertinent past medical history  Past Surgical History:   Procedure Laterality Date   • HERNIA REPAIR         History reviewed  No pertinent family history  I have reviewed and agree with the history as documented  E-Cigarette/Vaping   • E-Cigarette Use Never User      E-Cigarette/Vaping Substances   • Nicotine No    • THC No    • CBD No    • Flavoring No    • Other No    • Unknown No      Social History     Tobacco Use   • Smoking status: Former     Types: Cigarettes     Quit date: 1994     Years since quittin 0   • Smokeless tobacco: Never   Vaping Use   • Vaping Use: Never used   Substance Use Topics   • Alcohol use: Yes     Alcohol/week: 7 0 standard drinks     Types: 7 Glasses of wine per week   • Drug use: No        Review of Systems   Constitutional: Negative for chills and fever  HENT: Negative for congestion  Eyes: Negative for visual disturbance  Respiratory: Negative for shortness of breath  Cardiovascular: Negative for chest pain  Gastrointestinal: Negative for abdominal pain, nausea and vomiting  Genitourinary: Negative for dysuria, frequency and hematuria  Musculoskeletal: Positive for back pain  Skin: Negative for rash  Neurological: Negative for headaches  All other systems reviewed and are negative  Physical Exam  ED Triage Vitals   Temperature Pulse Respirations Blood Pressure SpO2   01/27/23 1025 01/27/23 1025 01/27/23 1025 01/27/23 1027 01/27/23 1025   97 7 °F (36 5 °C) 70 18 (!) 190/90 97 %      Temp Source Heart Rate Source Patient Position - Orthostatic VS BP Location FiO2 (%)   01/27/23 1025 01/27/23 1025 01/27/23 1025 01/27/23 1025 --   Oral Monitor Sitting Left arm       Pain Score       01/27/23 1025       5             Orthostatic Vital Signs  Vitals:    01/27/23 1025 01/27/23 1027 01/27/23 1123 01/27/23 1215   BP:  (!) 190/90 165/76    Pulse: 70  66 66   Patient Position - Orthostatic VS: Sitting  Sitting        Physical Exam  Vitals and nursing note reviewed  Constitutional:       General: She is not in acute distress  Appearance: Normal appearance  She is not ill-appearing  HENT:      Head: Normocephalic and atraumatic  Eyes:      Extraocular Movements: Extraocular movements intact  Cardiovascular:      Rate and Rhythm: Normal rate and regular rhythm  Pulmonary:      Effort: Pulmonary effort is normal  No respiratory distress  Breath sounds: Normal breath sounds  Abdominal:      Palpations: Abdomen is soft  Tenderness: There is no abdominal tenderness  Musculoskeletal:         General: Normal range of motion  Cervical back: Normal and normal range of motion  No spasms, tenderness or bony tenderness  Normal range of motion  Thoracic back: Tenderness present  No bony tenderness  Lumbar back: Normal  No spasms, tenderness or bony tenderness          Back:    Skin:     General: Skin is warm and dry  Neurological:      General: No focal deficit present  Mental Status: She is alert and oriented to person, place, and time           ED Medications  Medications   acetaminophen (TYLENOL) tablet 975 mg (975 mg Oral Given 1/27/23 1118)   sodium chloride 0 9 % bolus 500 mL (0 mL Intravenous Stopped 1/27/23 1249)   ketorolac (TORADOL) injection 15 mg (15 mg Intravenous Given 1/27/23 1118)       Diagnostic Studies  Results Reviewed     Procedure Component Value Units Date/Time    HS Troponin 0hr (reflex protocol) [[id number]]  (Normal) Collected: 01/27/23 1111    Lab Status: Final result Specimen: Blood from Arm, Left Updated: 01/27/23 1205     hs TnI 0hr 2 ng/L     Comprehensive metabolic panel [[id number]]  (Abnormal) Collected: 01/27/23 1111    Lab Status: Final result Specimen: Blood from Arm, Left Updated: 01/27/23 1139     Sodium 138 mmol/L      Potassium 4 1 mmol/L      Chloride 108 mmol/L      CO2 26 mmol/L      ANION GAP 4 mmol/L      BUN 12 mg/dL      Creatinine 0 58 mg/dL      Glucose 100 mg/dL      Calcium 9 6 mg/dL      AST 19 U/L      ALT 27 U/L      Alkaline Phosphatase 68 U/L      Total Protein 7 0 g/dL      Albumin 3 7 g/dL      Total Bilirubin 0 47 mg/dL      eGFR 87 ml/min/1 73sq m     Narrative:      [place] guidelines for Chronic Kidney Disease (CKD):   •  Stage 1 with normal or high GFR (GFR > 90 mL/min/1 73 square meters)  •  Stage 2 Mild CKD (GFR = 60-89 mL/min/1 73 square meters)  •  Stage 3A Moderate CKD (GFR = 45-59 mL/min/1 73 square meters)  •  Stage 3B Moderate CKD (GFR = 30-44 mL/min/1 73 square meters)  •  Stage 4 Severe CKD (GFR = 15-29 mL/min/1 73 square meters)  •  Stage 5 End Stage CKD (GFR <15 mL/min/1 73 square meters)  Note: GFR calculation is accurate only with a steady state creatinine    CBC and differential [[id number]] Collected: 01/27/23 1111    Lab Status: Final result Specimen: Blood from Arm, Left Updated: 01/27/23 1122 WBC 5 28 Thousand/uL      RBC 4 03 Million/uL      Hemoglobin 12 9 g/dL      Hematocrit 39 1 %      MCV 97 fL      MCH 32 0 pg      MCHC 33 0 g/dL      RDW 12 8 %      MPV 10 3 fL      Platelets 294 Thousands/uL      nRBC 0 /100 WBCs      Neutrophils Relative 66 %      Immat GRANS % 0 %      Lymphocytes Relative 21 %      Monocytes Relative 10 %      Eosinophils Relative 2 %      Basophils Relative 1 %      Neutrophils Absolute 3 41 Thousands/µL      Immature Grans Absolute 0 02 Thousand/uL      Lymphocytes Absolute 1 12 Thousands/µL      Monocytes Absolute 0 54 Thousand/µL      Eosinophils Absolute 0 12 Thousand/µL      Basophils Absolute 0 07 Thousands/µL                  CT chest without contrast   Final Result by Seth Starr MD (01/27 1219)      No lytic or blastic left rib lesion      Vertical lucency in the right superolateral corner of T8 with mild decrease in height of T8 compatible with an age indeterminate fracture  Correlate with site of pain to determine acuity  No acute pulmonary disease            Workstation performed: [id number]               Procedures  ECG 12 Lead Documentation Only    Date/Time: 1/27/2023 8:23 PM  Performed by: Santo Hayden DO  Authorized by: Santo Hayden DO     Indications / Diagnosis:  ACS rule out  ECG reviewed by me, the ED Provider: yes    Patient location:  ED  Previous ECG:     Previous ECG:  Unavailable  Interpretation:     Interpretation: normal    Rate:     ECG rate:  66    ECG rate assessment: normal    Rhythm:     Rhythm: sinus rhythm    Ectopy:     Ectopy: none    QRS:     QRS axis:  Normal    QRS intervals:  Normal  Conduction:     Conduction: normal    ST segments:     ST segments:  Normal  T waves:     T waves: normal            ED Course  ED Course as of 01/27/23 2019 Fri Jan 27, 2023   1210 hs TnI 0hr: 2   1222 IMPRESSION:     No lytic or blastic left rib lesion     Vertical lucency in the right superolateral corner of T8 with mild decrease in height of T8 compatible with an age indeterminate fracture  Correlate with site of pain to determine acuity      No acute pulmonary disease  SBIRT 20yo+    Flowsheet Row Most Recent Value   SBIRT (23 yo +)    In order to provide better care to our patients, we are screening all of our patients for alcohol and drug use  Would it be okay to ask you these screening questions? Unable to answer at this time Filed at: 01/27/2023 1030                Medical Decision Making  Patient is an 53-year-old female no past medical history presents to the ED for evaluation of 4 days of left-sided thoracic back pain  Denies any trauma or injury to the area or overuse  Notes that her pain is only with movement she is okay at rest   Endorses taking Tylenol at home without relief and heating pads with minimal relief  Patient denies any other complaints at this time  On exam patient is well-appearing heart regular rate rhythm lungs are clear to auscultation bilaterally abdomen soft nontender no CVA tenderness  There is no bony midline tenderness in the cervical thoracic or lumbar regions there is no paraspinal muscular tenderness along the back  There is an area of tenderness to palpation near the left rib 9/10  Although patient does not endorse history of she was taking phosphonate's patient is likely a osteoporotic or osteopenic  There is no zoster rash  Differential likely musculoskeletal in nature versus occult rib versus spine fracture  Patient endorses recent personal hardships at home her  passed away several days ago has been dealing with a lot of emotional stress related to this incident she is very nervous that there is something wrong with her heart that is causing this back pain Will order labs and imaging to assuage concerns  Will check EKG  Plan CBC BMP troponin EKG CT scan of chest Noncon    Lab work unremarkable troponin normal   EKG normal no ischemic changes (as discussed above)  CT chest did not show any acute rib fractures showed T8 compression fracture age-indeterminate  Analgesia offered Tylenol Toradol lidocaine patch  Patient reports no relief from analgesia however upon news of normal work-up patient was very relieved states he felt better and was very appreciative  Patient has remained hemodynamically stable and cleared for discharge with outpatient follow-up with her PCP  Discussed findings with pt not related to their chief complaint or primary disease process and advised outpatient follow up with their PCP for further evaluation and management  Pt is aware of findings and verbalized understanding      Left-sided thoracic back pain, unspecified chronicity: acute illness or injury  Amount and/or Complexity of Data Reviewed  Labs: ordered  Decision-making details documented in ED Course  Radiology: ordered  Risk  OTC drugs  Prescription drug management  Disposition  Final diagnoses:   Left-sided thoracic back pain, unspecified chronicity     Time reflects when diagnosis was documented in both MDM as applicable and the Disposition within this note     Time User Action Codes Description Comment    1/27/2023 12:38 PM Sharifa Must Add [M54 6] Left-sided thoracic back pain, unspecified chronicity       ED Disposition     ED Disposition   Discharge    Condition   Stable    Date/Time   Fri Jan 27, 2023 12:38 PM    Jonathan 207 discharge to home/self care                 Follow-up Information     Follow up With Specialties Details Why Contact Info    Sandrita Harrison MD Internal Medicine Call in 2 days  [address]  [phone]            Discharge Medication List as of 1/27/2023 12:41 PM      CONTINUE these medications which have NOT CHANGED    Details   alendronate (FOSAMAX) 70 mg tablet Take 70 mg by mouth every 7 days sunday  , Historical Med      MULTIPLE VITAMIN PO Take 1 tablet by mouth daily, Historical Med           No discharge procedures on file  PDMP Review     None           ED Provider  Attending physically available and evaluated Celestino Segovia I managed the patient along with the ED Attending      Electronically Signed by         Yasemin Vera DO  01/27/23 2025

## 2023-01-27 NOTE — ED ATTENDING ATTESTATION
Shiv Ortega MD, saw and evaluated the patient  I have discussed the patient with the resident and agree with the resident's findings, Plan of Care, and MDM as documented in the resident's note, except where noted  All available labs and Radiology studies were reviewed  I was present for key portions of any procedure(s) performed by the resident and I was immediately available to provide assistance  At this point I agree with the current assessment done in the Emergency Department  I have conducted an independent evaluation of this patient a history and physical is as follows:    [de-identified] yo female with a history of osteoarthritis brought to the ED by her son for evaluation of several days of left sided thoracic back/posterior rib pain  The patient reports a gradually worsening "ache" in her left thoracic back  The pain occurs with movement and is relieved by rest  She cannot recall any trauma or injury to the area  No recent falls  No associated chest pain or shortness of breath  She denies cough, fever, and hemoptysis  The patient took some APAP at home earlier today without significant relief  No numbness or weakness  She denies incontinence/retention  No other specific complaints  *Of note, the patient's  recently passed away  The  was on Wednesday  Son says she has been "dealing with a lot" at home the last few days  She denies SI, HI, and hallucinations  ROS: per resident physician note    Gen: NAD, AA&Ox3  HEENT: PERRL, EOMI  Neck: supple  CV: RRR  Lungs: CTA B/L  Abdomen: soft, NT/ND  Back: (+) focal tenderness to palpation over left lower posterior ribs, no midline bony tenderness, no crepitus  Ext: no swelling or deformity  Neuro: 5/5 strength all extremities, sensation grossly intact, steady gait  Skin: no rash    ED Course  The patient is comfortable appearing with stable vital signs  Exam is significant for some tenderness to the left lateral thoracic back   No midline bony tenderness  Unclear etiology of pain  Rib fractures vs pneumonia vs atypical ACS vs TAD vs grief reaction? Will check EKG, basic labs, troponin, and CT thorax  Toradol and Lidoderm patch administered  Will continue to monitor in the ED  Disposition per workup and reassessment        Critical Care Time  Procedures

## 2023-02-21 ENCOUNTER — HOSPITAL ENCOUNTER (OUTPATIENT)
Dept: RADIOLOGY | Facility: IMAGING CENTER | Age: 83
Discharge: HOME | End: 2023-02-21

## 2023-02-21 DIAGNOSIS — S22.060A COMPRESSION FRACTURE OF T8 VERTEBRA, INITIAL ENCOUNTER (HCC): ICD-10-CM

## 2024-05-23 ENCOUNTER — APPOINTMENT (OUTPATIENT)
Dept: LAB | Age: 84
End: 2024-05-23
Payer: MEDICARE

## 2024-05-23 DIAGNOSIS — E55.9 AVITAMINOSIS D: ICD-10-CM

## 2024-05-23 DIAGNOSIS — E78.5 HYPERLIPIDEMIA, UNSPECIFIED HYPERLIPIDEMIA TYPE: ICD-10-CM

## 2024-05-23 LAB
25(OH)D3 SERPL-MCNC: 49.1 NG/ML (ref 30–100)
ALBUMIN SERPL BCP-MCNC: 4.1 G/DL (ref 3.5–5)
ALP SERPL-CCNC: 68 U/L (ref 34–104)
ALT SERPL W P-5'-P-CCNC: 14 U/L (ref 7–52)
ANION GAP SERPL CALCULATED.3IONS-SCNC: 7 MMOL/L (ref 4–13)
AST SERPL W P-5'-P-CCNC: 19 U/L (ref 13–39)
BILIRUB SERPL-MCNC: 0.67 MG/DL (ref 0.2–1)
BUN SERPL-MCNC: 13 MG/DL (ref 5–25)
CALCIUM SERPL-MCNC: 9.7 MG/DL (ref 8.4–10.2)
CHLORIDE SERPL-SCNC: 105 MMOL/L (ref 96–108)
CHOLEST SERPL-MCNC: 195 MG/DL
CO2 SERPL-SCNC: 28 MMOL/L (ref 21–32)
CREAT SERPL-MCNC: 0.65 MG/DL (ref 0.6–1.3)
ERYTHROCYTE [DISTWIDTH] IN BLOOD BY AUTOMATED COUNT: 13.7 % (ref 11.6–15.1)
GFR SERPL CREATININE-BSD FRML MDRD: 83 ML/MIN/1.73SQ M
GLUCOSE P FAST SERPL-MCNC: 91 MG/DL (ref 65–99)
HCT VFR BLD AUTO: 39.7 % (ref 34.8–46.1)
HDLC SERPL-MCNC: 65 MG/DL
HGB BLD-MCNC: 12.9 G/DL (ref 11.5–15.4)
LDLC SERPL CALC-MCNC: 114 MG/DL (ref 0–100)
MCH RBC QN AUTO: 30.9 PG (ref 26.8–34.3)
MCHC RBC AUTO-ENTMCNC: 32.5 G/DL (ref 31.4–37.4)
MCV RBC AUTO: 95 FL (ref 82–98)
NONHDLC SERPL-MCNC: 130 MG/DL
PLATELET # BLD AUTO: 189 THOUSANDS/UL (ref 149–390)
PMV BLD AUTO: 11.2 FL (ref 8.9–12.7)
POTASSIUM SERPL-SCNC: 4.1 MMOL/L (ref 3.5–5.3)
PROT SERPL-MCNC: 6.7 G/DL (ref 6.4–8.4)
RBC # BLD AUTO: 4.18 MILLION/UL (ref 3.81–5.12)
SODIUM SERPL-SCNC: 140 MMOL/L (ref 135–147)
TRIGL SERPL-MCNC: 82 MG/DL
WBC # BLD AUTO: 4.37 THOUSAND/UL (ref 4.31–10.16)

## 2024-05-23 PROCEDURE — 85027 COMPLETE CBC AUTOMATED: CPT

## 2024-05-23 PROCEDURE — 80053 COMPREHEN METABOLIC PANEL: CPT

## 2024-05-23 PROCEDURE — 36415 COLL VENOUS BLD VENIPUNCTURE: CPT

## 2024-05-23 PROCEDURE — 82306 VITAMIN D 25 HYDROXY: CPT

## 2024-05-23 PROCEDURE — 80061 LIPID PANEL: CPT

## 2025-01-02 ENCOUNTER — APPOINTMENT (EMERGENCY)
Dept: RADIOLOGY | Facility: HOSPITAL | Age: 85
End: 2025-01-02
Payer: MEDICARE

## 2025-01-02 ENCOUNTER — HOSPITAL ENCOUNTER (EMERGENCY)
Facility: HOSPITAL | Age: 85
Discharge: HOME | End: 2025-01-02
Attending: EMERGENCY MEDICINE
Payer: MEDICARE

## 2025-01-02 VITALS
OXYGEN SATURATION: 94 % | RESPIRATION RATE: 20 BRPM | DIASTOLIC BLOOD PRESSURE: 74 MMHG | TEMPERATURE: 97.6 F | HEART RATE: 63 BPM | SYSTOLIC BLOOD PRESSURE: 158 MMHG

## 2025-01-02 DIAGNOSIS — K11.20 PAROTITIS: Primary | ICD-10-CM

## 2025-01-02 LAB
ANION GAP SERPL CALCULATED.3IONS-SCNC: 6 MMOL/L (ref 4–13)
BASOPHILS # BLD AUTO: 0.06 THOUSANDS/ΜL (ref 0–0.1)
BASOPHILS NFR BLD AUTO: 1 % (ref 0–1)
BUN SERPL-MCNC: 22 MG/DL (ref 5–25)
CALCIUM SERPL-MCNC: 10 MG/DL (ref 8.4–10.2)
CHLORIDE SERPL-SCNC: 106 MMOL/L (ref 96–108)
CO2 SERPL-SCNC: 27 MMOL/L (ref 21–32)
CREAT SERPL-MCNC: 0.61 MG/DL (ref 0.6–1.3)
EOSINOPHIL # BLD AUTO: 0.13 THOUSAND/ΜL (ref 0–0.61)
EOSINOPHIL NFR BLD AUTO: 2 % (ref 0–6)
ERYTHROCYTE [DISTWIDTH] IN BLOOD BY AUTOMATED COUNT: 12.8 % (ref 11.6–15.1)
GFR SERPL CREATININE-BSD FRML MDRD: 84 ML/MIN/1.73SQ M
GLUCOSE SERPL-MCNC: 96 MG/DL (ref 65–140)
HCT VFR BLD AUTO: 40 % (ref 34.8–46.1)
HGB BLD-MCNC: 12.9 G/DL (ref 11.5–15.4)
IMM GRANULOCYTES # BLD AUTO: 0.02 THOUSAND/UL (ref 0–0.2)
IMM GRANULOCYTES NFR BLD AUTO: 0 % (ref 0–2)
LYMPHOCYTES # BLD AUTO: 1.59 THOUSANDS/ΜL (ref 0.6–4.47)
LYMPHOCYTES NFR BLD AUTO: 25 % (ref 14–44)
MCH RBC QN AUTO: 31.1 PG (ref 26.8–34.3)
MCHC RBC AUTO-ENTMCNC: 32.3 G/DL (ref 31.4–37.4)
MCV RBC AUTO: 96 FL (ref 82–98)
MONOCYTES # BLD AUTO: 0.5 THOUSAND/ΜL (ref 0.17–1.22)
MONOCYTES NFR BLD AUTO: 8 % (ref 4–12)
NEUTROPHILS # BLD AUTO: 3.95 THOUSANDS/ΜL (ref 1.85–7.62)
NEUTS SEG NFR BLD AUTO: 64 % (ref 43–75)
NRBC BLD AUTO-RTO: 0 /100 WBCS
PLATELET # BLD AUTO: 208 THOUSANDS/UL (ref 149–390)
PMV BLD AUTO: 10.5 FL (ref 8.9–12.7)
POTASSIUM SERPL-SCNC: 4.3 MMOL/L (ref 3.5–5.3)
RBC # BLD AUTO: 4.15 MILLION/UL (ref 3.81–5.12)
SODIUM SERPL-SCNC: 139 MMOL/L (ref 135–147)
WBC # BLD AUTO: 6.25 THOUSAND/UL (ref 4.31–10.16)

## 2025-01-02 PROCEDURE — 36415 COLL VENOUS BLD VENIPUNCTURE: CPT

## 2025-01-02 PROCEDURE — 70491 CT SOFT TISSUE NECK W/DYE: CPT

## 2025-01-02 PROCEDURE — 85025 COMPLETE CBC W/AUTO DIFF WBC: CPT

## 2025-01-02 PROCEDURE — 80048 BASIC METABOLIC PNL TOTAL CA: CPT

## 2025-01-02 PROCEDURE — 73030 X-RAY EXAM OF SHOULDER: CPT

## 2025-01-02 PROCEDURE — 99284 EMERGENCY DEPT VISIT MOD MDM: CPT

## 2025-01-02 PROCEDURE — 99285 EMERGENCY DEPT VISIT HI MDM: CPT | Performed by: EMERGENCY MEDICINE

## 2025-01-02 RX ADMIN — IOHEXOL 85 ML: 350 INJECTION, SOLUTION INTRAVENOUS at 18:46

## 2025-01-02 NOTE — ED ATTENDING ATTESTATION
1/2/2025  I, Maria L Avina MD, saw and evaluated the patient. I have discussed the patient with the resident/non-physician practitioner and agree with the resident's/non-physician practitioner's findings, Plan of Care, and MDM as documented in the resident's/non-physician practitioner's note, except where noted. All available labs and Radiology studies were reviewed.  I was present for key portions of any procedure(s) performed by the resident/non-physician practitioner and I was immediately available to provide assistance.       At this point I agree with the current assessment done in the Emergency Department.  I have conducted an independent evaluation of this patient a history and physical is as follows:    ED Course         Critical Care Time  Procedures    83 yo female with left jaw pain and swelling started suddenly while eating lunch. Pt with no hx of same, no fever, no trouble swallowing. Pt noted swelling improved and still some remains. No cp, no sob. Pt also wlth left shoulder pain worse with movement.  No recent illness.  Vss, afebrile, lungs cta, rrr, abdomen soft nontender, left jaw with mild swelling along mandible, no lymphadenopathy, left shoulder with full rom, nvi.  Xray shoulder, ct soft tissue neck, labs.

## 2025-01-03 NOTE — ED PROVIDER NOTES
Time reflects when diagnosis was documented in both MDM as applicable and the Disposition within this note       Time User Action Codes Description Comment    1/2/2025  8:03 PM Hilda Rasheed Add [K11.20] Parotitis           ED Disposition       ED Disposition   Discharge    Condition   Stable    Date/Time   Thu Jan 2, 2025  8:03 PM    Comment   Sasha Ojedaaldair discharge to home/self care.                   Assessment & Plan       Medical Decision Making  Patient is a 84 y.o. female  who presents to the ED with left-sided neck swelling.    Vital signs stable. Exam as listed above.    Differential diagnosis includes but is not limited to parotitis, sialolithiasis, muscle spasm, doubt vascular injury, defer CTA at this time.    Plan   CBC to evaluate for anemia, evaluate for leukocytosis as sign of infectious source of symptoms.  BMP to evaluate for electrolyte derangement, assess renal function.  CT soft tissue neck to evaluate for parotitis, other causes of swelling in submandibular region.  Will XR left shoulder as patient complains of chronic but worsening pain.    View ED course below for further discussion on patient workup.     All labs reviewed and utilized in the medical decision making process  All radiology studies independently viewed by me and interpreted by the radiologist.  I reviewed all testing with the patient.     Upon re-evaluation discussed CT and treatment with sour/lemon candy. Also discussed suspected arthritis of shoulder, advised to follow up with PCP or orthopedic surgery for further evaluation for worsening functioning. Discussed parotitis return precautions and patient expresses understanding.      Amount and/or Complexity of Data Reviewed  Labs: ordered.  Radiology: ordered. Decision-making details documented in ED Course.    Risk  Prescription drug management.        ED Course as of 01/05/25 1707   Thu Jan 02, 2025 2002 CT soft tissue neck with contrast  IMPRESSION:  There is  enlargement of the left parotid gland with increased enhancement concerning for parotitis.       Medications   iohexol (OMNIPAQUE) 350 MG/ML injection (MULTI-DOSE) 85 mL (85 mL Intravenous Given 25)       ED Risk Strat Scores                          SBIRT 20yo+      Flowsheet Row Most Recent Value   Initial Alcohol Screen: US AUDIT-C     1. How often do you have a drink containing alcohol? 0 Filed at: 2025 1603   2. How many drinks containing alcohol do you have on a typical day you are drinking?  0 Filed at: 2025 1603   3b. FEMALE Any Age, or MALE 65+: How often do you have 4 or more drinks on one occassion? 0 Filed at: 2025 1603   Audit-C Score 0 Filed at: 2025 1603   MOISÉS: How many times in the past year have you...    Used an illegal drug or used a prescription medication for non-medical reasons? Never Filed at: 2025 1603                            History of Present Illness       Chief Complaint   Patient presents with    Jaw Pain     Pt was chewing her lunch when she got a sudden pain in the L side of her jaw, side of jaw swelled up for a few minutes and then swelling reduced. Pt denies pain currently        History reviewed. No pertinent past medical history.   Past Surgical History:   Procedure Laterality Date    HERNIA REPAIR        History reviewed. No pertinent family history.   Social History     Tobacco Use    Smoking status: Former     Current packs/day: 0.00     Types: Cigarettes     Quit date: 1994     Years since quittin.0    Smokeless tobacco: Never   Vaping Use    Vaping status: Never Used   Substance Use Topics    Alcohol use: Yes     Alcohol/week: 7.0 standard drinks of alcohol     Types: 7 Glasses of wine per week    Drug use: No      E-Cigarette/Vaping    E-Cigarette Use Never User       E-Cigarette/Vaping Substances    Nicotine No     THC No     CBD No     Flavoring No     Other No     Unknown No       I have reviewed and agree with the history  as documented.     Patient is an 84-year-old female, benign past medical history, presenting today with some swelling under her left mandible into her neck.  Patient states while she was eating lunch earlier she felt a pop and then got a large area of swelling in her neck which quickly went back down.  The area was without redness or warmth.  Still notes some mild residual swelling and pain with deep palpation of the area.  She denies pain inside her mouth, is able to move her jaw with no pain, no difficulty swallowing or breathing.  Patient denies radiation of any pain, but does note that she has some chronic worsening pain of her left shoulder.    No fevers, patient otherwise feeling well, denies nausea, vomiting, diarrhea, headache, lightheadedness, chest pain, shortness of breath.  Denies neck pain with ROM.  Still able to eat and drink as normal.              Review of Systems        Objective       ED Triage Vitals   Temperature Pulse Blood Pressure Respirations SpO2 Patient Position - Orthostatic VS   01/02/25 1222 01/02/25 1222 01/02/25 1222 01/02/25 1222 01/02/25 1222 01/02/25 1630   (!) 97.3 °F (36.3 °C) 87 159/77 16 97 % Sitting      Temp Source Heart Rate Source BP Location FiO2 (%) Pain Score    01/02/25 1440 01/02/25 1440 01/02/25 1440 -- --    Oral Monitor;Right Left arm        Vitals      Date and Time Temp Pulse SpO2 Resp BP Pain Score FACES Pain Rating User   01/02/25 2000 -- 63 94 % 20 158/74 -- -- MS   01/02/25 1800 -- 62 96 % 33 144/69 -- -- AM   01/02/25 1700 -- 66 95 % 22 148/70 -- -- BL   01/02/25 1630 -- 67 97 % 18 156/74 -- -- BL   01/02/25 1440 97.6 °F (36.4 °C) 69 -- -- 158/80 -- -- PJ   01/02/25 1222 97.3 °F (36.3 °C) 87 97 % 16 159/77 -- -- [place]            Physical Exam  Vitals and nursing note reviewed.   Constitutional:       General: She is not in acute distress.     Appearance: She is well-developed. She is not ill-appearing or diaphoretic.   HENT:      Head: Normocephalic and  atraumatic.      Nose: Nose normal.      Mouth/Throat:      Mouth: Mucous membranes are moist.   Eyes:      Conjunctiva/sclera: Conjunctivae normal.   Neck:      Comments: Minimal swelling of left submandibular soft tissue; tenderness to deep palpation of the area. No prominent lymph nodes.  Cardiovascular:      Rate and Rhythm: Normal rate and regular rhythm.      Heart sounds: No murmur heard.  Pulmonary:      Effort: Pulmonary effort is normal. No respiratory distress.      Breath sounds: Normal breath sounds. No wheezing or rhonchi.   Abdominal:      General: There is no distension.      Palpations: Abdomen is soft.      Tenderness: There is no abdominal tenderness.   Musculoskeletal:         General: No swelling.      Cervical back: Neck supple. No rigidity.      Right lower leg: No edema.      Left lower leg: No edema.   Lymphadenopathy:      Cervical: No cervical adenopathy.   Skin:     General: Skin is warm and dry.      Capillary Refill: Capillary refill takes less than 2 seconds.      Coloration: Skin is not pale.      Findings: No bruising, erythema, lesion or rash.   Neurological:      Mental Status: She is alert.   Psychiatric:         Mood and Affect: Mood normal.         Results Reviewed       Procedure Component Value Units Date/Time    Basic metabolic panel [[id number]] Collected: 01/02/25 1645    Lab Status: Final result Specimen: Blood from Line, Venous Updated: 01/02/25 1719     Sodium 139 mmol/L      Potassium 4.3 mmol/L      Chloride 106 mmol/L      CO2 27 mmol/L      ANION GAP 6 mmol/L      BUN 22 mg/dL      Creatinine 0.61 mg/dL      Glucose 96 mg/dL      Calcium 10.0 mg/dL      eGFR 84 ml/min/1.73sq m     Narrative:      National Kid[employer] Disease Foundation guidelines for Chronic Kidney Disease (CKD):     Stage 1 with normal or high GFR (GFR > 90 mL/min/1.73 square meters)    Stage 2 Mild CKD (GFR = 60-89 mL/min/1.73 square meters)    Stage 3A Moderate CKD (GFR = 45-59 mL/min/1.73 square  meters)    Stage 3B Moderate CKD (GFR = 30-44 mL/min/1.73 square meters)    Stage 4 Severe CKD (GFR = 15-29 mL/min/1.73 square meters)    Stage 5 End Stage CKD (GFR <15 mL/min/1.73 square meters)  Note: GFR calculation is accurate only with a steady state creatinine    CBC and differential [[id number]] Collected: 01/02/25 1645    Lab Status: Final result Specimen: Blood from Line, Venous Updated: 01/02/25 1701     WBC 6.25 Thousand/uL      RBC 4.15 Million/uL      Hemoglobin 12.9 g/dL      Hematocrit 40.0 %      MCV 96 fL      MCH 31.1 pg      MCHC 32.3 g/dL      RDW 12.8 %      MPV 10.5 fL      Platelets 208 Thousands/uL      nRBC 0 /100 WBCs      Segmented % 64 %      Immature Grans % 0 %      Lymphocytes % 25 %      Monocytes % 8 %      Eosinophils Relative 2 %      Basophils Relative 1 %      Absolute Neutrophils 3.95 Thousands/µL      Absolute Immature Grans 0.02 Thousand/uL      Absolute Lymphocytes 1.59 Thousands/µL      Absolute Monocytes 0.50 Thousand/µL      Eosinophils Absolute 0.13 Thousand/µL      Basophils Absolute 0.06 Thousands/µL             CT soft tissue neck with contrast   Final Interpretation by Prosper Prescott MD (01/02 1947)      There is enlargement of the left parotid gland with increased enhancement concerning for parotitis.            Workstation performed: [id number]         XR shoulder 2+ views LEFT   Final Interpretation by Kayden Blanton MD (01/03 0708)      No acute osseous abnormality.      Degenerative changes as described.         Computerized Assisted Algorithm (CAA) may have been used to analyze all applicable images.         Workstation performed: [id number]             Procedures    ED Medication and Procedure Management   Prior to Admission Medications   Prescriptions Last Dose Informant Patient Reported? Taking?   MULTIPLE VITAMIN PO   Yes No   Sig: Take 1 tablet by mouth daily   alendronate (FOSAMAX) 70 mg tablet   Yes No   Sig: Take 70 mg by mouth every 7 days  sunday        Facility-Administered Medications: None     Discharge Medication List as of 1/2/2025  8:06 PM        CONTINUE these medications which have NOT CHANGED    Details   alendronate (FOSAMAX) 70 mg tablet Take 70 mg by mouth every 7 days sunday  , Historical Med      MULTIPLE VITAMIN PO Take 1 tablet by mouth daily, Historical Med             ED SEPSIS DOCUMENTATION   Time reflects when diagnosis was documented in both MDM as applicable and the Disposition within this note       Time User Action Codes Description Comment    1/2/2025  8:03 PM Hilda Rasheed Add [K11.20] Parotitis                  Hilda Rasheed,   01/05/25 1716

## 2025-01-03 NOTE — DISCHARGE INSTRUCTIONS
You were seen in the emergency department today for neck swelling.    We did a CT which showed a swelling of your parotid gland. You should suck on lemon drops or other sour candy over the next few days - this will increase salivation and should clear the gland. You should stay well hydrated and can also use warm compresses.    You should return to the emergency department if you experience worsening swelling, pain, or fevers.    Thank you for choosing [facility]!  ]

## 2025-01-09 ENCOUNTER — HOSPITAL ENCOUNTER (OUTPATIENT)
Dept: RADIOLOGY | Age: 85
Discharge: HOME | End: 2025-01-09
Payer: MEDICARE

## 2025-01-09 VITALS — BODY MASS INDEX: 23.39 KG/M2 | WEIGHT: 132 LBS | HEIGHT: 63 IN

## 2025-01-09 DIAGNOSIS — Z12.31 SCREENING MAMMOGRAM FOR BREAST CANCER: ICD-10-CM

## 2025-01-09 PROCEDURE — 77067 SCR MAMMO BI INCL CAD: CPT

## 2025-01-09 PROCEDURE — 77063 BREAST TOMOSYNTHESIS BI: CPT

## 2025-01-11 ENCOUNTER — APPOINTMENT (OUTPATIENT)
Dept: LAB | Age: 85
End: 2025-01-11
Payer: MEDICARE

## 2025-01-11 DIAGNOSIS — M81.0 SENILE OSTEOPOROSIS: ICD-10-CM

## 2025-01-11 DIAGNOSIS — I10 ESSENTIAL HYPERTENSION, MALIGNANT: ICD-10-CM

## 2025-01-11 LAB
25(OH)D3 SERPL-MCNC: 38.8 NG/ML (ref 30–100)
ALBUMIN SERPL BCG-MCNC: 4.1 G/DL (ref 3.5–5)
ALP SERPL-CCNC: 61 U/L (ref 34–104)
ALT SERPL W P-5'-P-CCNC: 13 U/L (ref 7–52)
ANION GAP SERPL CALCULATED.3IONS-SCNC: 5 MMOL/L (ref 4–13)
AST SERPL W P-5'-P-CCNC: 17 U/L (ref 13–39)
BILIRUB SERPL-MCNC: 0.51 MG/DL (ref 0.2–1)
BUN SERPL-MCNC: 13 MG/DL (ref 5–25)
CALCIUM SERPL-MCNC: 9.4 MG/DL (ref 8.4–10.2)
CHLORIDE SERPL-SCNC: 106 MMOL/L (ref 96–108)
CO2 SERPL-SCNC: 28 MMOL/L (ref 21–32)
CREAT SERPL-MCNC: 0.53 MG/DL (ref 0.6–1.3)
GFR SERPL CREATININE-BSD FRML MDRD: 88 ML/MIN/1.73SQ M
GLUCOSE P FAST SERPL-MCNC: 88 MG/DL (ref 65–99)
POTASSIUM SERPL-SCNC: 4 MMOL/L (ref 3.5–5.3)
PROT SERPL-MCNC: 6.6 G/DL (ref 6.4–8.4)
SODIUM SERPL-SCNC: 139 MMOL/L (ref 135–147)

## 2025-01-11 PROCEDURE — 36415 COLL VENOUS BLD VENIPUNCTURE: CPT

## 2025-01-11 PROCEDURE — 80053 COMPREHEN METABOLIC PANEL: CPT

## 2025-01-11 PROCEDURE — 82306 VITAMIN D 25 HYDROXY: CPT

## 2025-04-23 ENCOUNTER — HOSPITAL ENCOUNTER (EMERGENCY)
Facility: HOSPITAL | Age: 85
Discharge: HOME | End: 2025-04-23
Attending: EMERGENCY MEDICINE
Payer: MEDICARE

## 2025-04-23 ENCOUNTER — APPOINTMENT (EMERGENCY)
Dept: RADIOLOGY | Facility: HOSPITAL | Age: 85
End: 2025-04-23
Payer: MEDICARE

## 2025-04-23 VITALS
SYSTOLIC BLOOD PRESSURE: 164 MMHG | DIASTOLIC BLOOD PRESSURE: 72 MMHG | RESPIRATION RATE: 18 BRPM | TEMPERATURE: 98.5 F | OXYGEN SATURATION: 99 % | HEART RATE: 81 BPM

## 2025-04-23 DIAGNOSIS — S99.191A CLOSED FRACTURE OF BASE OF FIFTH METATARSAL BONE OF RIGHT FOOT AT METAPHYSEAL-DIAPHYSEAL JUNCTION, INITIAL ENCOUNTER: Primary | ICD-10-CM

## 2025-04-23 PROCEDURE — 73630 X-RAY EXAM OF FOOT: CPT

## 2025-04-23 PROCEDURE — 99284 EMERGENCY DEPT VISIT MOD MDM: CPT | Performed by: EMERGENCY MEDICINE

## 2025-04-23 PROCEDURE — 99283 EMERGENCY DEPT VISIT LOW MDM: CPT

## 2025-04-23 RX ORDER — ACETAMINOPHEN 325 MG/1
975 TABLET ORAL ONCE
Status: COMPLETED | OUTPATIENT
Start: 2025-04-23 | End: 2025-04-23

## 2025-04-23 RX ADMIN — ACETAMINOPHEN 975 MG: 325 TABLET, FILM COATED ORAL at 18:48

## 2025-04-23 NOTE — ED PROVIDER NOTES
Time reflects when diagnosis was documented in both MDM as applicable and the Disposition within this note       Time User Action Codes Description Comment    4/23/2025  7:59 PM Sean Vasquez Add [S99.191A] Closed fracture of base of fifth metatarsal bone of right foot at metaphyseal-diaphyseal junction, initial encounter           ED Disposition       ED Disposition   Discharge    Condition   Stable    Date/Time   Wed Apr 23, 2025  7:59 PM    Comment   Sasha FRENCH Hodgson discharge to home/self care.                   Assessment & Plan       Medical Decision Making  84-year-old female with history of osteoporosis presents to the emergency room for evaluation of right foot pain and bruising after she twisted to answer the phone and hurt her right foot.  She is having some pain at the area but is able to bear weight.  She notes some tingling on the dorsum of the right foot.  She did not fall or hit her head and has no other pain anywhere else.  She has no history of injuries or surgeries to the foot in the past.  Denies headache, chest pain, shortness of breath, abdominal pain, nausea, vomiting, weakness, lacerations, gait abnormality.    Patient mildly hypertensive with otherwise normal vital signs at presentation.  Patient is well-appearing and not in any acute distress.  Alert and oriented x 3.  Extraocular muscles are intact.  Respiratory effort normal.  Distal pulses are equal and intact and pulse rate is normal with regular rhythm.  There is no lower extremity edema.  There is bruising overlying the base of the right fifth metatarsal with associated bony tenderness but no obvious deformity.  There is normal range of motion at the ankle and toes with no other bony tenderness including the lateral and medial malleoli.    Bruising likely secondary to trauma.  Will get x-ray to rule out fracture.  Will also treat pain with Tylenol and ice.    X-ray consistent with dancers fracture.  Placed patient in boot and  gave podiatry referral.  I discussed the workup and plan with the patient.  Patient able to ambulate without issue in cam boot. Patient voiced understanding of the plan and all questions were answered. Strict return precautions given. Patient is hemodynamically stable and safe for discharge at this time.    Amount and/or Complexity of Data Reviewed  Radiology: independent interpretation performed.    Risk  OTC drugs.             Medications   acetaminophen (TYLENOL) tablet 975 mg (975 mg Oral Given 4/23/25 1848)       ED Risk Strat Scores                    No data recorded        SBIRT 22yo+      Flowsheet Row Most Recent Value   Initial Alcohol Screen: US AUDIT-C     1. How often do you have a drink containing alcohol? 0 Filed at: 04/23/2025 1839   2. How many drinks containing alcohol do you have on a typical day you are drinking?  0 Filed at: 04/23/2025 1839   3b. FEMALE Any Age, or MALE 65+: How often do you have 4 or more drinks on one occassion? 0 Filed at: 04/23/2025 1839   Audit-C Score 0 Filed at: 04/23/2025 1839   MOISÉS: How many times in the past year have you...    Used an illegal drug or used a prescription medication for non-medical reasons? Never Filed at: 04/23/2025 1839                            History of Present Illness       Chief Complaint   Patient presents with    Foot Injury     Pt went to answer the phone and twisted her foot/ankle.  Has bruising noted to the lateral aspect of her R foot.  Able to bear but has pain and swelling.        History reviewed. No pertinent past medical history.   Past Surgical History:   Procedure Laterality Date    HERNIA REPAIR        Family History   Problem Relation Age of Onset    Heart disease Mother     Heart disease Father     No Known Problems Sister     No Known Problems Sister     No Known Problems Sister     No Known Problems Sister     No Known Problems Sister     No Known Problems Sister     No Known Problems Sister     No Known Problems Maternal  Grandmother     No Known Problems Paternal Grandmother     No Known Problems Maternal Aunt     No Known Problems Maternal Aunt     No Known Problems Maternal Aunt     No Known Problems Paternal Aunt     No Known Problems Paternal Aunt     No Known Problems Paternal Aunt     No Known Problems Paternal Aunt       Social History     Tobacco Use    Smoking status: Former     Current packs/day: 0.00     Types: Cigarettes     Quit date: 1994     Years since quittin.3    Smokeless tobacco: Never   Vaping Use    Vaping status: Never Used   Substance Use Topics    Alcohol use: Yes     Alcohol/week: 7.0 standard drinks of alcohol     Types: 7 Glasses of wine per week    Drug use: No      E-Cigarette/Vaping    E-Cigarette Use Never User       E-Cigarette/Vaping Substances    Nicotine No     THC No     CBD No     Flavoring No     Other No     Unknown No       I have reviewed and agree with the history as documented.     HPI    Review of Systems        Objective       ED Triage Vitals   Temperature Pulse Blood Pressure Respirations SpO2 Patient Position - Orthostatic VS   25 17425 17425 174 --   98.5 °F (36.9 °C) 81 164/72 18 99 %       Temp Source Heart Rate Source BP Location FiO2 (%) Pain Score    25 -- -- -- 25    Temporal    5      Vitals      Date and Time Temp Pulse SpO2 Resp BP Pain Score FACES Pain Rating User   25 1848 -- -- -- -- -- 5 -- AM   25 1749 -- -- 99 % -- 164/72 -- -- BMM   25 98.5 °F (36.9 °C) 81 -- 18 -- 5 -- BMM            Physical Exam    Results Reviewed       None            XR foot 3+ views RIGHT   ED Interpretation by Mich Jimenez MD (1956)   Fracture 5th MT base           Procedures    ED Medication and Procedure Management   Prior to Admission Medications   Prescriptions Last Dose Informant Patient Reported? Taking?   MULTIPLE VITAMIN PO   Yes No   Sig: Take 1 tablet by mouth daily    alendronate (FOSAMAX) 70 mg tablet   Yes No   Sig: Take 70 mg by mouth every 7 days sunday        Facility-Administered Medications: None     Discharge Medication List as of 4/23/2025  8:01 PM        CONTINUE these medications which have NOT CHANGED    Details   alendronate (FOSAMAX) 70 mg tablet Take 70 mg by mouth every 7 days sunday  , Historical Med      MULTIPLE VITAMIN PO Take 1 tablet by mouth daily, Historical Med             ED SEPSIS DOCUMENTATION   Time reflects when diagnosis was documented in both MDM as applicable and the Disposition within this note       Time User Action Codes Description Comment    4/23/2025  7:59 PM Sean Vasquez Add [S99.191A] Closed fracture of base of fifth metatarsal bone of right foot at metaphyseal-diaphyseal junction, initial encounter                  Sean Vasquez DO  04/24/25 0044

## 2025-04-23 NOTE — ED ATTENDING ATTESTATION
4/23/2025  I, Mich Jimenez MD, saw and evaluated the patient. I have discussed the patient with the resident/non-physician practitioner and agree with the resident's/non-physician practitioner's findings, Plan of Care, and MDM as documented in the resident's/non-physician practitioner's note, except where noted. All available labs and Radiology studies were reviewed.  I was present for key portions of any procedure(s) performed by the resident/non-physician practitioner and I was immediately available to provide assistance.       At this point I agree with the current assessment done in the Emergency Department.  I have conducted an independent evaluation of this patient a history and physical is as follows:  Twisted right foot ankle   today    No fall   can bear wt   Exam  knee ok   From   N/v intact  tender base of 5th mt   ankle from tender no tenderness over the anterior talofibular  X-ray to rule out fracture  ED Course     Dancers fx  5th mt      Critical Care Time  Procedures

## 2025-04-23 NOTE — ED NOTES
Cold pack provided to patient. Applied to injury site.      Brooke M Markle, RN  04/23/25 1811       Brooke M Markle, RN  04/23/25 1810

## 2025-04-24 ENCOUNTER — TELEPHONE (OUTPATIENT)
Age: 85
End: 2025-04-24

## 2025-04-24 NOTE — TELEPHONE ENCOUNTER
Caller: Saen Hodgson    Doctor and/or Office: [facility]#: [phone]    Escalation: Appointment Requesting a forced appt at [facility] for closed fracture of base of 5th metatarsal bone of right foot. How soon should patient be seen? Please return call to Sean to schedule. Thank you

## 2025-04-24 NOTE — DISCHARGE INSTRUCTIONS
Please wear the boot at all times while you are awake and walking.  Please follow-up with the podiatrist.  You can take Tylenol for pain.  You can also elevate the foot and use ice.  You should return to the emergency room for severe uncontrolled pain, if you are unable to move your foot, or if you are unable to ambulate.

## 2025-05-22 ENCOUNTER — APPOINTMENT (OUTPATIENT)
Dept: RADIOLOGY | Age: 85
End: 2025-05-22
Attending: PODIATRIST
Payer: MEDICARE

## 2025-05-22 ENCOUNTER — OFFICE VISIT (OUTPATIENT)
Dept: PODIATRY | Facility: CLINIC | Age: 85
End: 2025-05-22

## 2025-05-22 VITALS — HEIGHT: 63 IN | BODY MASS INDEX: 22.86 KG/M2 | WEIGHT: 129 LBS

## 2025-05-22 DIAGNOSIS — M79.671 RIGHT FOOT PAIN: ICD-10-CM

## 2025-05-22 DIAGNOSIS — S92.351D CLOSED FRACTURE OF BASE OF FIFTH METATARSAL BONE WITH ROUTINE HEALING, RIGHT: Primary | ICD-10-CM

## 2025-05-22 PROCEDURE — 73630 X-RAY EXAM OF FOOT: CPT

## 2025-05-22 NOTE — PROGRESS NOTES
"Name: Sasha Hodgson      : 1940      MRN: [id number]  Encounter Provider: Giovanny Guzman DPM  Encounter Date: 2025   Encounter department: [facility] PODIATRY [facility]  :  Assessment & Plan  Closed fracture of base of fifth metatarsal bone with routine healing, right  CAM boot 2-4 more weeks. She has no pain or weakness. XR reviewed with her, stable alignment  RTC 4-6 weeks if symptomatic.   Orders:  •  XR foot 3+ vw right; Future    XRay 3 views of the right foot personally read by Dr. Guzman in office today and discussed with patient:    5th met styloid fracture stable, no significant displacement compared to initial XR  Age appropriate degenerative changes.  No lytic or blastic osseous lesion.  Soft tissues edema      History of Present Illness   HPI  Sasha Hodgson is a 84 y.o. female who presents for fracture care  DOI     Right foot twisted awkwardly. XR in ED showed 5th met base fracture. Her pain is minimal 4 weeks post-injury      Review of Systems  As stated in HPI, otherwise normal    Medical History Reviewed by provider this encounter:  Tobacco  Allergies  Meds  Problems  Med Hx  Surg Hx  Fam Hx           Objective   Ht 5' 3\" (1.6 m)   Wt 58.5 kg (129 lb)   BMI 22.85 kg/m²      Physical Exam  Vitals reviewed.     Cardiovascular:      Rate and Rhythm: Normal rate.      Pulses: Normal pulses.   Pulmonary:      Effort: Pulmonary effort is normal. No respiratory distress.     Musculoskeletal:         General: Swelling present. No tenderness or deformity.     Skin:     General: Skin is warm.      Findings: No erythema or rash.     Neurological:      Mental Status: She is alert.      Sensory: No sensory deficit.           "

## 2025-06-26 ENCOUNTER — OFFICE VISIT (OUTPATIENT)
Dept: PODIATRY | Facility: CLINIC | Age: 85
End: 2025-06-26
Payer: MEDICARE

## 2025-06-26 VITALS — HEIGHT: 61 IN | BODY MASS INDEX: 24.35 KG/M2 | WEIGHT: 129 LBS

## 2025-06-26 DIAGNOSIS — S92.351D CLOSED FRACTURE OF BASE OF FIFTH METATARSAL BONE WITH ROUTINE HEALING, RIGHT: Primary | ICD-10-CM

## 2025-06-26 PROCEDURE — 99213 OFFICE O/P EST LOW 20 MIN: CPT | Performed by: PODIATRIST

## 2025-06-26 NOTE — PROGRESS NOTES
"Name: Sasha Hodgson      : 1940      MRN: [id number]  Encounter Provider: Giovanny Guzman DPM  Encounter Date: 2025   Encounter department: [facility] PODIATRY [facility]  :  Assessment & Plan  Closed fracture of base of fifth metatarsal bone with routine healing, right  She has no pain. She's been walking in her house without boot for 2 weeks. She feels well. Given her age and 2 months of CAM boot, recommend PT to strengthen the ankle.   Orders:  •  Ambulatory Referral to Physical Therapy; Future        History of Present Illness   HPI  Sasha Hodgson is a 84 y.o. female who presents for F/U right 5th met fracture. Two month old injury. She feels fine      Review of Systems       Objective   Ht 5' 1\" (1.549 m)   Wt 58.5 kg (129 lb)   BMI 24.37 kg/m²      Physical Exam  Vitals reviewed.     Cardiovascular:      Rate and Rhythm: Normal rate.      Pulses: Normal pulses.   Pulmonary:      Effort: Pulmonary effort is normal. No respiratory distress.     Musculoskeletal:         General: No tenderness (no pain or weakness 5th iqra right foot) or deformity.     Skin:     General: Skin is warm.      Findings: No erythema or rash.     Neurological:      Mental Status: She is alert.      Sensory: No sensory deficit.           "

## 2025-07-09 ENCOUNTER — EVALUATION (OUTPATIENT)
Dept: PHYSICAL THERAPY | Age: 85
End: 2025-07-09
Attending: PODIATRIST
Payer: MEDICARE

## 2025-07-09 DIAGNOSIS — S92.351D CLOSED FRACTURE OF BASE OF FIFTH METATARSAL BONE WITH ROUTINE HEALING, RIGHT: Primary | ICD-10-CM

## 2025-07-09 DIAGNOSIS — M17.11 PRIMARY OSTEOARTHRITIS OF RIGHT KNEE: ICD-10-CM

## 2025-07-09 DIAGNOSIS — M17.12 PRIMARY OSTEOARTHRITIS OF LEFT KNEE: ICD-10-CM

## 2025-07-09 PROCEDURE — 97162 PT EVAL MOD COMPLEX 30 MIN: CPT | Performed by: PHYSICAL THERAPIST

## 2025-07-09 PROCEDURE — 97110 THERAPEUTIC EXERCISES: CPT | Performed by: PHYSICAL THERAPIST

## 2025-07-09 NOTE — PROGRESS NOTES
PT Evaluation     Today's date: 2025  Patient name: Sasha Hodgson  : 1940  MRN: [id number]  Referring provider: Giovanny Guzman D*  Dx:   Encounter Diagnosis     ICD-10-CM    1. Closed fracture of base of fifth metatarsal bone with routine healing, right  S92.351D Ambulatory Referral to Physical Therapy      2. Primary osteoarthritis of right knee  M17.11       3. Primary osteoarthritis of left knee  M17.12                      Assessment  Impairments: abnormal gait, abnormal or restricted ROM, abnormal movement, activity intolerance, impaired physical strength, lacks appropriate home exercise program and pain with function    Assessment details: PT IE: 25.  Patient reported she went to the ED on 25 due to her reports of she went to answer the phone and twisted her foot/ankle.  Patient denies falls.  Patient noted she twisted without losing her balance.  Patient noted she had right ankle edema at onset of injury but she noted edema had decreased.  Patient noted she was in a CAM boot after the injury, which she noted she needed use of CAM boot to walk.  Patient denies right foot pain at rest, but she noted her bilateral knee pain and right foot pain limits her ability to perform prolonged walking activities.  Patient noted current symptoms of bilateral knee pain limits walking, static standing activities, stair climbing activities.  Patient reported stair climbing remain limited by pain in bilateral knees.  Patient noted all chair transfers are limited by bilateral knee pain and right foot pain and bilateral lower extremity weakness.  Patient noted she has to sit on her buttocks to move down the steps due to apprehension of falling, bilateral knee and right foot pain aggravation and weakness.  Patient noted she limits stair climbing.  Patient noted does clean the house despite bilateral knee pain aggravation.  Patient denies bilateral lower extremity deficits despite bilateral knee  pain with maximum flexion.  Patient noted she has sleep deficits due to bilateral gastrocnemius cramping.  Patient denies deficits with showering and grooming.  Patient denies bilateral knee edema.  Patient noted she wears bilateral knee braces for pain reduction and functional progress.  Patient denies allergies to tapes and adhesives and falls.  Understanding of Dx/Px/POC: excellent     Prognosis: good  Prognosis details: Patient is a 84 y.o. year old female seen for outpatient PT evaluation with a Closed fracture of base of fifth metatarsal bone with routine healing, right [S92.095D] . Patient presents to PT IE with the following problems, concerns, deficits and impairments: right lateral foot and bilateral knee region pain, decreased right lower extremity range of motion, decreased right lower extremity strength, gait and stair dysfunctions, transfer dysfunctions, + TTP, balance deficits, functional limitations and decreased tolerance to activity.  Patient would benefit from skilled PT services under the following PT treatment plan to address the above noted deficits: therapeutic exercises and activities to facilitate right lower extremity mobility and strength, modalities, manual therapy techniques, gait and stair training, transfer training, balance and proprioception activities, IASTM techniques, Kinesio taping techniques, and a hep.  Thank you for the referral.     Goals  Short Term goals 4 - 6 weeks  1.  Patient will be independent HEP.   2.  Patient will report a 25 - 50% decrease in pain complaints.  3.  Increase strength 1/2 grade.  4.  Increase ROM 5-10 degrees.    Long Term goals 8 - 12 weeks  1.  Patient will report elimination of pain complaints.  2.  Patient will return to all recreational activities without restriction.  3.  ROM WFL.  4.  Strength 5/5.  5.  Patient will report ability to perform self right lower extremity dressing activities without bilateral lower extremity pain aggravation or  limitations.  6.  Patient will report ability to perform house hold and community walking without bilateral lower extremity pain aggravation or limitations.  7.  Patient will report ability to perform house hold stair climbing without bilateral lower extremity pain aggravation or limitations.  8.  Patient will report ability to perform all transfers without bilateral lower extremity pain aggravation or limitations.  transfers  9.  Patient will report ability to perform house hold chores and activities in standing without bilateral lower extremity pain aggravation or limitations.  10.  Patient will report ability to sleep without bilateral lower extremity pain aggravation or limitations.    Plan  Patient would benefit from: skilled physical therapy and PT eval  Planned modality interventions: low level laser therapy, manual electrical stimulation, TENS, thermotherapy: hydrocollator packs, ultrasound, unattended electrical stimulation, cryotherapy and electrical stimulation/Russian stimulation    Planned therapy interventions: IASTM, joint mobilization, kinesiology taping, manual therapy, massage, balance, balance/weight bearing training, neuromuscular re-education, postural training, body mechanics training, self care, compression, strengthening, stretching, therapeutic activities, therapeutic exercise, therapeutic training, transfer training, flexibility, functional ROM exercises, gait training, graded activity, graded exercise, graded motor, home exercise program, IADL retraining and aquatic therapy    Frequency: 1-2x week  Duration in weeks: 12  Treatment plan discussed with: patient      Subjective Evaluation    History of Present Illness  Mechanism of injury: Patient's PMHx is remarkable for bilateral knee OA and recent closed fracture of 5th metatarsal bone.      RIGHT FOOT     INDICATION:   Pain in right foot.       COMPARISON:  None.     VIEWS:  XR FOOT 3+ VW RIGHT      FINDINGS:     There is a fracture at  the base of the fifth metatarsal bone. The other osseous structures are intact. There is no dislocation or subluxation.     IMPRESSION:        Hawkins fracture          Patient Goals  Patient goals for therapy: decreased pain, improved balance, increased motion, return to sport/leisure activities, independence with ADLs/IADLs and increased strength  Patient goal: to resume all of her functional activities without right foot and bilateral knee pain  Pain  At best pain ratin  At worst pain ratin  Location: 5th toe and lateral foot on right and bilateral knee      Objective     Tenderness     Additional Tenderness Details  Patient is + minimal TTP at left medial knee joint line and + moderate TTP at jalyn-patellar region and medial joint line.  Patient is - TTP at right 5th ray region.    Active Range of Motion   Left Hip   Flexion: 124 degrees   Abduction: 40 degrees     Right Hip   Flexion: 122 degrees with pain  Abduction: 32 degrees   Left Knee   Flexion: 122 degrees   Extension: -6 degrees   Extensor la degrees     Right Knee   Flexion: 112 degrees with pain  Extension: -6 degrees   Extensor la degrees with pain  Left Ankle/Foot   Dorsiflexion (ke): 10 degrees   Plantar flexion: 40 degrees   Inversion: 40 degrees with pain  Eversion: 22 degrees with pain    Right Ankle/Foot   Dorsiflexion (ke): 10 degrees   Plantar flexion: 30 degrees   Inversion: 30 degrees   Eversion: 18 degrees     Strength/Myotome Testing     Left Hip   Planes of Motion   Flexion: 4+  Extension: 4+  Abduction: 4+  Adduction: 5    Right Hip   Planes of Motion   Flexion: 4+  Extension: 4+  Abduction: 4+  Adduction: 5    Left Knee   Flexion: 4+  Extension: 4    Right Knee   Flexion: 4+  Extension: 4    Left Ankle/Foot   Dorsiflexion: 4  Plantar flexion: 5  Inversion: 4+  Eversion: 4+    Right Ankle/Foot   Dorsiflexion: 4-  Plantar flexion: 4+  Inversion: 4+  Eversion: 4+    Ambulation     Ambulation: Level Surfaces   Ambulation  "without assistive device: independent    Additional Level Surfaces Ambulation Details  Patient ambulates with decrease in pace, decrease in bilateral knee flexion with swing phase and decrease in bilateral step length.    Ambulation: Stairs   Pattern: creeps up  Pattern: creeps down    Comments   PT IE: 7/9/25.  Tug is at 21.85 sec.  5 x sit to stand with chair cushion 19.02 seconds             Precautions: Bilateral Knee OA.    Recent closed fracture of base of 5th metatarsal.      Manuals 7/9                         Kinesio taping to bilateral patella in a \"U\" pattern with base of \"U\" at the patellar tendon and tails at medial and lateral patella at 50-75% tension 10 min                                      Neuro Re-Ed                                                                                                        Ther Ex                          Nu step                          Seated hr and tr:B 10 x hep            LAQ:B 10 x hep            Hip flexion seated:B: 10 x hep            Seated hip abduction isometrics:B             Seated hip adduction isometrics:B                          Supine ankle pumps:B             Quad sets:B             Heel slides:B             SAQ:B:             Bridges             Slr flexion:B                          Standing slr x 3:B             Standing hr and tr:B             Standing hamstring curls:B             Mini squats             Lunges:B             SLS and tandem stance:B:             Side stepping and tandem walking             Forward step ups:B 6\"            Lateral step ups:B 6\"            Step downs:B 4\"                         [employer] balance system LOS                          Ther Activity                                       Gait Training                                       Modalities                          MHP to bilateral knees seated                               "

## 2025-07-09 NOTE — HOME EXERCISE EDUCATION
Program_ID:[id number]   Access Code: [email or web address]  URL: [email or web address]  Date: 07-  Prepared By: Hai Whitley    Program Notes      Exercises      - Seated Heel Toe Raises - 1 x daily - 7 x weekly - 2 sets - 10 reps      - Seated Long Arc Quad - 1 x daily - 7 x weekly - 2 sets - 10 reps      - Seated March - 1 x daily - 7 x weekly - 2 sets - 10 reps

## 2025-07-16 ENCOUNTER — OFFICE VISIT (OUTPATIENT)
Dept: PHYSICAL THERAPY | Age: 85
End: 2025-07-16
Attending: PODIATRIST
Payer: MEDICARE

## 2025-07-16 DIAGNOSIS — M17.11 PRIMARY OSTEOARTHRITIS OF RIGHT KNEE: ICD-10-CM

## 2025-07-16 DIAGNOSIS — M17.12 PRIMARY OSTEOARTHRITIS OF LEFT KNEE: ICD-10-CM

## 2025-07-16 DIAGNOSIS — S92.351D CLOSED FRACTURE OF BASE OF FIFTH METATARSAL BONE WITH ROUTINE HEALING, RIGHT: Primary | ICD-10-CM

## 2025-07-16 PROCEDURE — 97110 THERAPEUTIC EXERCISES: CPT

## 2025-07-16 NOTE — PROGRESS NOTES
"Daily Note     Today's date: 2025  Patient name: Sasha Hodgson  : 1940  MRN: [id number]  Referring provider: Giovanny Guzman D*  Dx:   Encounter Diagnosis     ICD-10-CM    1. Closed fracture of base of fifth metatarsal bone with routine healing, right  S92.351D       2. Primary osteoarthritis of right knee  M17.11       3. Primary osteoarthritis of left knee  M17.12                      Subjective: Pt reports she is doing well overall, no major changes since last session.       Objective: See treatment diary below      Assessment: Tolerated treatment well. Pt performed all exercises without issue, continue to progress to tolerance. Pt responded positively to added exercises, continue to focus on strengthening and stability exercises to improve function and decrease pain and limitations. Patient demonstrated fatigue post treatment, exhibited good technique with therapeutic exercises, and would benefit from continued PT      Plan: Continue per plan of care.      Precautions: Bilateral Knee OA.    Recent closed fracture of base of 5th metatarsal.      Manuals                         Kinesio taping to bilateral patella in a \"U\" pattern with base of \"U\" at the patellar tendon and tails at medial and lateral patella at 50-75% tension 10 min nv                                     Neuro Re-Ed                                                                                                        Ther Ex                          Nu step  10'                        Seated hr and tr:B 10 x hep            LAQ:B 10 x hep            Hip flexion seated:B: 10 x hep            Seated hip abduction isometrics:B  2x10 3\"           Seated hip adduction isometrics:B  2x10 3\"                        Supine ankle pumps:B             Quad sets:B             Heel slides:B             SAQ:B:             Bridges  2x10 3\"           Slr flexion:B  2x10 ea                        Standing slr x 3:B  2x10 ea Bilat      " "     Standing hr and tr:B  2x10           Standing hamstring curls:B  2x10           Mini squats  2x10           Lunges:B             SLS and tandem stance:B:             Side stepping and tandem walking             Forward step ups:B 6\" 6\" 2x10 ea           Lateral step ups:B 6\" 4\" 2x10 ea           Step downs:B 4\"                         [employer] balance system LOS                          Ther Activity                                       Gait Training                                       Modalities                          MHP to bilateral knees seated                               "

## 2025-07-17 ENCOUNTER — OFFICE VISIT (OUTPATIENT)
Dept: PHYSICAL THERAPY | Age: 85
End: 2025-07-17
Attending: PODIATRIST
Payer: MEDICARE

## 2025-07-17 DIAGNOSIS — M17.11 PRIMARY OSTEOARTHRITIS OF RIGHT KNEE: ICD-10-CM

## 2025-07-17 DIAGNOSIS — M17.12 PRIMARY OSTEOARTHRITIS OF LEFT KNEE: ICD-10-CM

## 2025-07-17 DIAGNOSIS — S92.351D CLOSED FRACTURE OF BASE OF FIFTH METATARSAL BONE WITH ROUTINE HEALING, RIGHT: Primary | ICD-10-CM

## 2025-07-17 PROCEDURE — 97110 THERAPEUTIC EXERCISES: CPT

## 2025-07-17 NOTE — PROGRESS NOTES
"Daily Note     Today's date: 2025  Patient name: Sasha Hodgson  : 1940  MRN: [id number]  Referring provider: Giovanny Guzman D*  Dx:   Encounter Diagnosis     ICD-10-CM    1. Closed fracture of base of fifth metatarsal bone with routine healing, right  S92.351D       2. Primary osteoarthritis of right knee  M17.11       3. Primary osteoarthritis of left knee  M17.12                      Subjective: Patient no using cane today.  Patient overs no new complaints.       Objective: See treatment diary below      Assessment: Tolerated treatment well. Pt performed all exercises without issue, continue to progress to tolerance.  Patient demonstrated fatigue post treatment, exhibited good technique with therapeutic exercises, and would benefit from continued PT.  Patient limits knee flexion and extension during gait cycle due to pain.      Plan: Continue per plan of care.      Precautions: Bilateral Knee OA.    Recent closed fracture of base of 5th metatarsal.      Manuals                        Kinesio taping to bilateral patella in a \"U\" pattern with base of \"U\" at the patellar tendon and tails at medial and lateral patella at 50-75% tension 10 min nv                                     Neuro Re-Ed                                                                                                        Ther Ex                          Nu step  10' 10'                       Seated hr and tr:B 10 x hep            LAQ:B 10 x hep            Hip flexion seated:B: 10 x hep            Seated hip abduction isometrics:B  2x10 3\" 2x10 3\"          Seated hip adduction isometrics:B  2x10 3\" 2x10 3\"                       Supine ankle pumps:B             Quad sets:B             Heel slides:B   X20 ea          SAQ:B:             Bridges  2x10 3\" 2x10 3\"          Slr flexion:B  2x10 ea 2x10 ea                       Standing slr x 3:B  2x10 ea Bilat 2x10 ea B            Standing hr and tr:B  2x10 2x10      " "    Standing hamstring curls:B  2x10 2x10          Mini squats  2x10 2x10          Lunges:B             SLS and tandem stance:B:             Side stepping and tandem walking             Forward step ups:B 6\" 6\" 2x10 ea 6\" 2x10 ea          Lateral step ups:B 6\" 4\" 2x10 ea 6\" 2x10 ea          Step downs:B 4\"                         [employer] balance system LOS                          Ther Activity                                       Gait Training                                       Modalities                          MHP to bilateral knees seated                               "

## 2025-07-21 ENCOUNTER — OFFICE VISIT (OUTPATIENT)
Dept: PHYSICAL THERAPY | Age: 85
End: 2025-07-21
Attending: PODIATRIST
Payer: MEDICARE

## 2025-07-21 DIAGNOSIS — S92.351D CLOSED FRACTURE OF BASE OF FIFTH METATARSAL BONE WITH ROUTINE HEALING, RIGHT: Primary | ICD-10-CM

## 2025-07-21 PROCEDURE — 97112 NEUROMUSCULAR REEDUCATION: CPT | Performed by: PHYSICAL THERAPIST

## 2025-07-21 PROCEDURE — 97140 MANUAL THERAPY 1/> REGIONS: CPT | Performed by: PHYSICAL THERAPIST

## 2025-07-21 PROCEDURE — 97110 THERAPEUTIC EXERCISES: CPT | Performed by: PHYSICAL THERAPIST

## 2025-07-21 NOTE — PROGRESS NOTES
"Daily Note     Today's date: 2025  Patient name: Sasha Hodgson  : 1940  MRN: [id number]  Referring provider: Giovanny Guzman D*  Dx:   Encounter Diagnosis     ICD-10-CM    1. Closed fracture of base of fifth metatarsal bone with routine healing, right  S92.351D                      Subjective: Patient reported back to back PT visits last week created bilateral knee soreness, thus she requested holding back to back PT visits.  Patient denies right foot pain but she noted she still is getting right calf cramping.       Objective: See treatment diary below      Assessment: Patient presents with knee flexion biased exercises in both and open and closed kinetic chain manner aggravating her bilateral knee pain, not her right foot pain.  Patient exhibits use of a knee extension bias during therapeutic exercises as her most beneficial knee position and pain reduction.  Thus, PT is warranted to facilitate bilateral lower extremity strength in a manner in which she does not bilateral knee pain aggravation to promote full functional progress.      Plan: Continue per plan of care.      Precautions: Bilateral Knee OA.    Recent closed fracture of base of right 5th metatarsal.      Manuals                       Kinesio taping to bilateral patella in a \"U\" pattern with base of \"U\" at the patellar tendon and tails at medial and lateral patella at 50-75% tension 10 min nv  10 min                                   Neuro Re-Ed                                                                                                        Ther Ex                          Nu step  10' 10' 10 min                      Seated hr and tr:B 10 x hep            LAQ:B 10 x hep            Hip flexion seated:B: 10 x hep            Seated hip abduction isometrics:B  2x10 3\" 2x10 3\"          Seated hip adduction isometrics:B  2x10 3\" 2x10 3\"                       Supine ankle pumps:B    NT         Quad sets:B    3 " "sec x 20         Heel slides:B   X20 ea 2 x 10         SAQ:B:    2 x 10         Bridges  2x10 3\" 2x10 3\" 2 x 10         Slr flexion:B  2x10 ea 2x10 ea 2 x 10                      Standing slr x 3:B  2x10 ea Bilat 2x10 ea B   2 x 10         Standing hr and tr:B  2x10 2x10 2 x 10         Standing hamstring curls:B  2x10 2x10 NT         Mini squats  2x10 2x10 2 x 10         Lunges:B             SLS and tandem stance:B:    30 sec x 2         Side stepping and tandem walking    In parallel bars 10 feet x 4         Forward step ups:B 6\" 6\" 2x10 ea 6\" 2x10 ea NT         Lateral step ups:B 6\" 4\" 2x10 ea 6\" 2x10 ea NT         Step downs:B 4\"                         [employer] balance system LOS                          Ther Activity                                       Gait Training                                       Modalities                          MHP to bilateral knees seated    10 min                            "

## 2025-07-25 ENCOUNTER — OFFICE VISIT (OUTPATIENT)
Dept: PHYSICAL THERAPY | Age: 85
End: 2025-07-25
Attending: PODIATRIST
Payer: MEDICARE

## 2025-07-25 DIAGNOSIS — S92.351D CLOSED FRACTURE OF BASE OF FIFTH METATARSAL BONE WITH ROUTINE HEALING, RIGHT: Primary | ICD-10-CM

## 2025-07-25 DIAGNOSIS — M17.12 PRIMARY OSTEOARTHRITIS OF LEFT KNEE: ICD-10-CM

## 2025-07-25 DIAGNOSIS — M17.11 PRIMARY OSTEOARTHRITIS OF RIGHT KNEE: ICD-10-CM

## 2025-07-25 PROCEDURE — 97110 THERAPEUTIC EXERCISES: CPT

## 2025-07-25 PROCEDURE — 97112 NEUROMUSCULAR REEDUCATION: CPT

## 2025-07-25 NOTE — PROGRESS NOTES
"Daily Note     Today's date: 2025  Patient name: Sasha Hodgson  : 1940  MRN: [id number]  Referring provider: Giovanny Guzman D*  Dx:   Encounter Diagnosis     ICD-10-CM    1. Closed fracture of base of fifth metatarsal bone with routine healing, right  S92.351D       2. Primary osteoarthritis of right knee  M17.11       3. Primary osteoarthritis of left knee  M17.12                      Subjective: Patient states she has been feeling some of the benefits to the therapy and the taping at home as well with her knees.       Objective: See treatment diary below      Assessment: Tolerated treatment well. Good response and relief from all exercises and showing some good progress and getting better with steps at home. R LE/ knee is more uncomfortable at times than the L. Reviewed HEP and issued BTB for home. No other increased symptoms noted. Encouraged patient to drink more water in general as patient has also been experiencing cramps in her B calves as well. Patient would benefit from continued PT      Plan: Continue per plan of care.      Precautions: Bilateral Knee OA.    Recent closed fracture of base of right 5th metatarsal.      Manuals                      Kinesio taping to bilateral patella in a \"U\" pattern with base of \"U\" at the patellar tendon and tails at medial and lateral patella at 50-75% tension 10 min nv  10 min Has at home to do per patient                                   Neuro Re-Ed                                                                                                        Ther Ex                          Nu step  10' 10' 10 min L 1 x 10 min                      Seated hr and tr:B 10 x hep            LAQ:B 10 x hep            Hip flexion seated:B: 10 x hep            Seated hip abduction isometrics:B  2x10 3\" 2x10 3\"  BTB 2 x 10    3\"         Seated hip adduction isometrics:B  2x10 3\" 2x10 3\"  2 x 10 w ball 3\"                     Supine ankle " "pumps:B    NT         Quad sets:B    3 sec x 20 Home today         Heel slides:B   X20 ea 2 x 10         SAQ:B:    2 x 10 2 x 10         Bridges  2x10 3\" 2x10 3\" 2 x 10 2 x 10         Slr flexion:B  2x10 ea 2x10 ea 2 x 10 2 x 10                      Standing slr x 3:B  2x10 ea Bilat 2x10 ea B   2 x 10 2 x 10         Standing hr and tr:B  2x10 2x10 2 x 10 2 x 10         Standing hamstring curls:B  2x10 2x10 NT 2 x 10         Mini squats  2x10 2x10 2 x 10 2 x 10         Lunges:B             SLS and tandem stance:B:    30 sec x 2 Nt         Side stepping and tandem walking    In parallel bars 10 feet x 4 nt        Forward step ups:B 6\" 6\" 2x10 ea 6\" 2x10 ea NT 6\" 2 x 10         Lateral step ups:B 6\" 4\" 2x10 ea 6\" 2x10 ea NT 6\" 2 x 10         Step downs:B 4\"                         [employer] balance system LOS                          Ther Activity                                       Gait Training                                       Modalities                          MHP to bilateral knees seated    10 min  X 10 min B supine                             "

## 2025-07-29 ENCOUNTER — OFFICE VISIT (OUTPATIENT)
Dept: PHYSICAL THERAPY | Age: 85
End: 2025-07-29
Attending: PODIATRIST
Payer: MEDICARE

## 2025-07-29 DIAGNOSIS — M17.11 PRIMARY OSTEOARTHRITIS OF RIGHT KNEE: ICD-10-CM

## 2025-07-29 DIAGNOSIS — S92.351D CLOSED FRACTURE OF BASE OF FIFTH METATARSAL BONE WITH ROUTINE HEALING, RIGHT: Primary | ICD-10-CM

## 2025-07-29 DIAGNOSIS — M17.12 PRIMARY OSTEOARTHRITIS OF LEFT KNEE: ICD-10-CM

## 2025-07-29 PROCEDURE — 97112 NEUROMUSCULAR REEDUCATION: CPT

## 2025-07-29 PROCEDURE — 97110 THERAPEUTIC EXERCISES: CPT

## 2025-08-01 ENCOUNTER — OFFICE VISIT (OUTPATIENT)
Dept: PHYSICAL THERAPY | Age: 85
End: 2025-08-01
Attending: PODIATRIST
Payer: MEDICARE

## 2025-08-01 DIAGNOSIS — M17.11 PRIMARY OSTEOARTHRITIS OF RIGHT KNEE: ICD-10-CM

## 2025-08-01 DIAGNOSIS — S92.351D CLOSED FRACTURE OF BASE OF FIFTH METATARSAL BONE WITH ROUTINE HEALING, RIGHT: Primary | ICD-10-CM

## 2025-08-01 DIAGNOSIS — M17.12 PRIMARY OSTEOARTHRITIS OF LEFT KNEE: ICD-10-CM

## 2025-08-01 PROCEDURE — 97110 THERAPEUTIC EXERCISES: CPT | Performed by: PHYSICAL THERAPIST

## 2025-08-01 PROCEDURE — 97112 NEUROMUSCULAR REEDUCATION: CPT | Performed by: PHYSICAL THERAPIST

## 2025-08-05 ENCOUNTER — OFFICE VISIT (OUTPATIENT)
Dept: PHYSICAL THERAPY | Age: 85
End: 2025-08-05
Attending: PODIATRIST
Payer: MEDICARE

## 2025-08-05 DIAGNOSIS — S92.351D CLOSED FRACTURE OF BASE OF FIFTH METATARSAL BONE WITH ROUTINE HEALING, RIGHT: ICD-10-CM

## 2025-08-05 DIAGNOSIS — M17.11 PRIMARY OSTEOARTHRITIS OF RIGHT KNEE: ICD-10-CM

## 2025-08-05 DIAGNOSIS — M17.12 PRIMARY OSTEOARTHRITIS OF LEFT KNEE: Primary | ICD-10-CM

## 2025-08-05 PROCEDURE — 97110 THERAPEUTIC EXERCISES: CPT

## 2025-08-08 ENCOUNTER — OFFICE VISIT (OUTPATIENT)
Dept: PHYSICAL THERAPY | Age: 85
End: 2025-08-08
Attending: PODIATRIST
Payer: MEDICARE

## 2025-08-08 DIAGNOSIS — M17.11 PRIMARY OSTEOARTHRITIS OF RIGHT KNEE: ICD-10-CM

## 2025-08-08 DIAGNOSIS — S92.351D CLOSED FRACTURE OF BASE OF FIFTH METATARSAL BONE WITH ROUTINE HEALING, RIGHT: ICD-10-CM

## 2025-08-08 DIAGNOSIS — M17.12 PRIMARY OSTEOARTHRITIS OF LEFT KNEE: Primary | ICD-10-CM

## 2025-08-08 PROCEDURE — 97110 THERAPEUTIC EXERCISES: CPT | Performed by: PHYSICAL THERAPIST

## 2025-08-08 PROCEDURE — 97112 NEUROMUSCULAR REEDUCATION: CPT | Performed by: PHYSICAL THERAPIST
